# Patient Record
Sex: FEMALE | Race: WHITE | Employment: OTHER | URBAN - METROPOLITAN AREA
[De-identification: names, ages, dates, MRNs, and addresses within clinical notes are randomized per-mention and may not be internally consistent; named-entity substitution may affect disease eponyms.]

---

## 2024-08-14 ENCOUNTER — APPOINTMENT (OUTPATIENT)
Dept: ULTRASOUND IMAGING | Age: 69
DRG: 871 | End: 2024-08-14
Payer: MEDICARE

## 2024-08-14 ENCOUNTER — APPOINTMENT (OUTPATIENT)
Dept: CT IMAGING | Age: 69
DRG: 871 | End: 2024-08-14
Payer: MEDICARE

## 2024-08-14 ENCOUNTER — HOSPITAL ENCOUNTER (INPATIENT)
Age: 69
DRG: 871 | End: 2024-08-14
Attending: EMERGENCY MEDICINE | Admitting: INTERNAL MEDICINE
Payer: MEDICARE

## 2024-08-14 ENCOUNTER — APPOINTMENT (OUTPATIENT)
Dept: GENERAL RADIOLOGY | Age: 69
DRG: 871 | End: 2024-08-14
Payer: MEDICARE

## 2024-08-14 DIAGNOSIS — K85.90 ACUTE PANCREATITIS, UNSPECIFIED COMPLICATION STATUS, UNSPECIFIED PANCREATITIS TYPE: ICD-10-CM

## 2024-08-14 DIAGNOSIS — R79.89 ELEVATED LFTS: ICD-10-CM

## 2024-08-14 DIAGNOSIS — K80.20 GALLSTONES: ICD-10-CM

## 2024-08-14 DIAGNOSIS — R17 PAINLESS JAUNDICE: Primary | ICD-10-CM

## 2024-08-14 DIAGNOSIS — D72.829 LEUKOCYTOSIS, UNSPECIFIED TYPE: ICD-10-CM

## 2024-08-14 DIAGNOSIS — I24.9 ACUTE CORONARY SYNDROME (HCC): ICD-10-CM

## 2024-08-14 PROBLEM — K81.0 ACUTE CHOLECYSTITIS: Status: ACTIVE | Noted: 2024-08-14

## 2024-08-14 PROBLEM — K83.1 OBSTRUCTIVE JAUNDICE: Status: ACTIVE | Noted: 2024-08-14

## 2024-08-14 LAB
ALBUMIN SERPL-MCNC: 2.9 G/DL (ref 3.5–5.2)
ALP SERPL-CCNC: 240 U/L (ref 35–104)
ALT SERPL-CCNC: 110 U/L (ref 0–32)
AMMONIA PLAS-SCNC: 20 UMOL/L (ref 11–51)
ANION GAP SERPL CALCULATED.3IONS-SCNC: 14 MMOL/L (ref 7–16)
AST SERPL-CCNC: 75 U/L (ref 0–31)
B PARAP IS1001 DNA NPH QL NAA+NON-PROBE: NOT DETECTED
B PERT DNA SPEC QL NAA+PROBE: NOT DETECTED
BASOPHILS # BLD: 0 K/UL (ref 0–0.2)
BASOPHILS NFR BLD: 0 % (ref 0–2)
BILIRUB DIRECT SERPL-MCNC: 3.9 MG/DL (ref 0–0.3)
BILIRUB INDIRECT SERPL-MCNC: 1.6 MG/DL (ref 0–1)
BILIRUB SERPL-MCNC: 5.5 MG/DL (ref 0–1.2)
BILIRUB SERPL-MCNC: 5.7 MG/DL (ref 0–1.2)
BUN SERPL-MCNC: 12 MG/DL (ref 6–23)
C PNEUM DNA NPH QL NAA+NON-PROBE: NOT DETECTED
CALCIUM SERPL-MCNC: 8.1 MG/DL (ref 8.6–10.2)
CHLORIDE SERPL-SCNC: 84 MMOL/L (ref 98–107)
CO2 SERPL-SCNC: 27 MMOL/L (ref 22–29)
CREAT SERPL-MCNC: 0.8 MG/DL (ref 0.5–1)
EKG ATRIAL RATE: 80 BPM
EKG P AXIS: 29 DEGREES
EKG P-R INTERVAL: 156 MS
EKG Q-T INTERVAL: 388 MS
EKG QRS DURATION: 84 MS
EKG QTC CALCULATION (BAZETT): 447 MS
EKG R AXIS: 19 DEGREES
EKG T AXIS: 129 DEGREES
EKG VENTRICULAR RATE: 80 BPM
EOSINOPHIL # BLD: 0 K/UL (ref 0.05–0.5)
EOSINOPHILS RELATIVE PERCENT: 0 % (ref 0–6)
ERYTHROCYTE [DISTWIDTH] IN BLOOD BY AUTOMATED COUNT: 13.7 % (ref 11.5–15)
FLUAV RNA NPH QL NAA+NON-PROBE: NOT DETECTED
FLUBV RNA NPH QL NAA+NON-PROBE: NOT DETECTED
GFR, ESTIMATED: 80 ML/MIN/1.73M2
GLUCOSE SERPL-MCNC: 152 MG/DL (ref 74–99)
HADV DNA NPH QL NAA+NON-PROBE: NOT DETECTED
HCOV 229E RNA NPH QL NAA+NON-PROBE: NOT DETECTED
HCOV HKU1 RNA NPH QL NAA+NON-PROBE: NOT DETECTED
HCOV NL63 RNA NPH QL NAA+NON-PROBE: NOT DETECTED
HCOV OC43 RNA NPH QL NAA+NON-PROBE: NOT DETECTED
HCT VFR BLD AUTO: 41.2 % (ref 34–48)
HGB BLD-MCNC: 13.8 G/DL (ref 11.5–15.5)
HMPV RNA NPH QL NAA+NON-PROBE: NOT DETECTED
HPIV1 RNA NPH QL NAA+NON-PROBE: NOT DETECTED
HPIV2 RNA NPH QL NAA+NON-PROBE: NOT DETECTED
HPIV3 RNA NPH QL NAA+NON-PROBE: NOT DETECTED
HPIV4 RNA NPH QL NAA+NON-PROBE: NOT DETECTED
INR PPP: 1.7
LACTATE BLDV-SCNC: 2.2 MMOL/L (ref 0.5–1.9)
LIPASE SERPL-CCNC: 371 U/L (ref 13–60)
LYMPHOCYTES NFR BLD: 0.41 K/UL (ref 1.5–4)
LYMPHOCYTES RELATIVE PERCENT: 1 % (ref 20–42)
M PNEUMO DNA NPH QL NAA+NON-PROBE: NOT DETECTED
MCH RBC QN AUTO: 29.1 PG (ref 26–35)
MCHC RBC AUTO-ENTMCNC: 33.5 G/DL (ref 32–34.5)
MCV RBC AUTO: 86.7 FL (ref 80–99.9)
MONOCYTES NFR BLD: 2.9 K/UL (ref 0.1–0.95)
MONOCYTES NFR BLD: 6 % (ref 2–12)
NEUTROPHILS NFR BLD: 93 % (ref 43–80)
NEUTS SEG NFR BLD: 44.38 K/UL (ref 1.8–7.3)
PARTIAL THROMBOPLASTIN TIME: 27.5 SEC (ref 24.5–35.1)
PLATELET # BLD AUTO: 407 K/UL (ref 130–450)
PMV BLD AUTO: 10.1 FL (ref 7–12)
POTASSIUM SERPL-SCNC: 3.2 MMOL/L (ref 3.5–5)
PROT SERPL-MCNC: 7.5 G/DL (ref 6.4–8.3)
PROTHROMBIN TIME: 18.1 SEC (ref 9.3–12.4)
RBC # BLD AUTO: 4.75 M/UL (ref 3.5–5.5)
RBC # BLD: ABNORMAL 10*6/UL
RSV RNA NPH QL NAA+NON-PROBE: NOT DETECTED
RV+EV RNA NPH QL NAA+NON-PROBE: NOT DETECTED
SARS-COV-2 RDRP RESP QL NAA+PROBE: NOT DETECTED
SARS-COV-2 RNA NPH QL NAA+NON-PROBE: NOT DETECTED
SODIUM SERPL-SCNC: 125 MMOL/L (ref 132–146)
SPECIMEN DESCRIPTION: NORMAL
SPECIMEN DESCRIPTION: NORMAL
TROPONIN I SERPL HS-MCNC: 135 NG/L (ref 0–9)
TROPONIN I SERPL HS-MCNC: 138 NG/L (ref 0–9)
TSH SERPL DL<=0.05 MIU/L-ACNC: 1.63 UIU/ML (ref 0.27–4.2)
WBC OTHER # BLD: 47.7 K/UL (ref 4.5–11.5)

## 2024-08-14 PROCEDURE — 99285 EMERGENCY DEPT VISIT HI MDM: CPT

## 2024-08-14 PROCEDURE — 80053 COMPREHEN METABOLIC PANEL: CPT

## 2024-08-14 PROCEDURE — 74177 CT ABD & PELVIS W/CONTRAST: CPT

## 2024-08-14 PROCEDURE — 96374 THER/PROPH/DIAG INJ IV PUSH: CPT

## 2024-08-14 PROCEDURE — 87040 BLOOD CULTURE FOR BACTERIA: CPT

## 2024-08-14 PROCEDURE — 85610 PROTHROMBIN TIME: CPT

## 2024-08-14 PROCEDURE — 87635 SARS-COV-2 COVID-19 AMP PRB: CPT

## 2024-08-14 PROCEDURE — 93005 ELECTROCARDIOGRAM TRACING: CPT | Performed by: EMERGENCY MEDICINE

## 2024-08-14 PROCEDURE — 2060000000 HC ICU INTERMEDIATE R&B

## 2024-08-14 PROCEDURE — 2580000003 HC RX 258: Performed by: EMERGENCY MEDICINE

## 2024-08-14 PROCEDURE — 96375 TX/PRO/DX INJ NEW DRUG ADDON: CPT

## 2024-08-14 PROCEDURE — 6360000004 HC RX CONTRAST MEDICATION: Performed by: RADIOLOGY

## 2024-08-14 PROCEDURE — 82248 BILIRUBIN DIRECT: CPT

## 2024-08-14 PROCEDURE — 96361 HYDRATE IV INFUSION ADD-ON: CPT

## 2024-08-14 PROCEDURE — 84484 ASSAY OF TROPONIN QUANT: CPT

## 2024-08-14 PROCEDURE — 0202U NFCT DS 22 TRGT SARS-COV-2: CPT

## 2024-08-14 PROCEDURE — 82140 ASSAY OF AMMONIA: CPT

## 2024-08-14 PROCEDURE — 6360000002 HC RX W HCPCS: Performed by: EMERGENCY MEDICINE

## 2024-08-14 PROCEDURE — 85025 COMPLETE CBC W/AUTO DIFF WBC: CPT

## 2024-08-14 PROCEDURE — 83690 ASSAY OF LIPASE: CPT

## 2024-08-14 PROCEDURE — 6360000002 HC RX W HCPCS: Performed by: NURSE PRACTITIONER

## 2024-08-14 PROCEDURE — 2580000003 HC RX 258: Performed by: NURSE PRACTITIONER

## 2024-08-14 PROCEDURE — 85730 THROMBOPLASTIN TIME PARTIAL: CPT

## 2024-08-14 PROCEDURE — 76705 ECHO EXAM OF ABDOMEN: CPT

## 2024-08-14 PROCEDURE — 84443 ASSAY THYROID STIM HORMONE: CPT

## 2024-08-14 PROCEDURE — 93010 ELECTROCARDIOGRAM REPORT: CPT | Performed by: INTERNAL MEDICINE

## 2024-08-14 PROCEDURE — 83605 ASSAY OF LACTIC ACID: CPT

## 2024-08-14 PROCEDURE — 71045 X-RAY EXAM CHEST 1 VIEW: CPT

## 2024-08-14 RX ORDER — HYDROCHLOROTHIAZIDE 25 MG/1
25 TABLET ORAL EVERY MORNING
COMMUNITY

## 2024-08-14 RX ORDER — SODIUM CHLORIDE 0.9 % (FLUSH) 0.9 %
5-40 SYRINGE (ML) INJECTION EVERY 12 HOURS SCHEDULED
Status: DISCONTINUED | OUTPATIENT
Start: 2024-08-14 | End: 2024-08-20 | Stop reason: HOSPADM

## 2024-08-14 RX ORDER — METOPROLOL SUCCINATE 25 MG/1
25 TABLET, EXTENDED RELEASE ORAL EVERY MORNING
Status: DISCONTINUED | OUTPATIENT
Start: 2024-08-15 | End: 2024-08-16

## 2024-08-14 RX ORDER — POTASSIUM CHLORIDE 7.45 MG/ML
10 INJECTION INTRAVENOUS PRN
Status: DISCONTINUED | OUTPATIENT
Start: 2024-08-14 | End: 2024-08-20 | Stop reason: HOSPADM

## 2024-08-14 RX ORDER — CYANOCOBALAMIN 1000 UG/ML
1000 INJECTION, SOLUTION INTRAMUSCULAR; SUBCUTANEOUS
Status: DISCONTINUED | OUTPATIENT
Start: 2024-09-01 | End: 2024-08-20 | Stop reason: HOSPADM

## 2024-08-14 RX ORDER — METOPROLOL SUCCINATE 25 MG/1
25 TABLET, EXTENDED RELEASE ORAL EVERY MORNING
Status: ON HOLD | COMMUNITY
End: 2024-08-20 | Stop reason: HOSPADM

## 2024-08-14 RX ORDER — ONDANSETRON 4 MG/1
4 TABLET, ORALLY DISINTEGRATING ORAL EVERY 8 HOURS PRN
Status: DISCONTINUED | OUTPATIENT
Start: 2024-08-14 | End: 2024-08-20 | Stop reason: HOSPADM

## 2024-08-14 RX ORDER — BISACODYL 10 MG
10 SUPPOSITORY, RECTAL RECTAL DAILY PRN
Status: DISCONTINUED | OUTPATIENT
Start: 2024-08-14 | End: 2024-08-20 | Stop reason: HOSPADM

## 2024-08-14 RX ORDER — ONDANSETRON 2 MG/ML
4 INJECTION INTRAMUSCULAR; INTRAVENOUS ONCE
Status: DISCONTINUED | OUTPATIENT
Start: 2024-08-14 | End: 2024-08-20 | Stop reason: HOSPADM

## 2024-08-14 RX ORDER — 0.9 % SODIUM CHLORIDE 0.9 %
1000 INTRAVENOUS SOLUTION INTRAVENOUS ONCE
Status: COMPLETED | OUTPATIENT
Start: 2024-08-14 | End: 2024-08-14

## 2024-08-14 RX ORDER — POTASSIUM CHLORIDE 7.45 MG/ML
10 INJECTION INTRAVENOUS
Status: DISPENSED | OUTPATIENT
Start: 2024-08-14 | End: 2024-08-14

## 2024-08-14 RX ORDER — SODIUM CHLORIDE 0.9 % (FLUSH) 0.9 %
5-40 SYRINGE (ML) INJECTION PRN
Status: DISCONTINUED | OUTPATIENT
Start: 2024-08-14 | End: 2024-08-20 | Stop reason: HOSPADM

## 2024-08-14 RX ORDER — LEVOTHYROXINE SODIUM 75 UG/1
75 TABLET ORAL EVERY MORNING
COMMUNITY

## 2024-08-14 RX ORDER — CYANOCOBALAMIN 1000 UG/ML
1000 INJECTION, SOLUTION INTRAMUSCULAR; SUBCUTANEOUS
COMMUNITY
Start: 2024-06-04

## 2024-08-14 RX ORDER — LEVOTHYROXINE SODIUM 75 UG/1
75 TABLET ORAL EVERY MORNING
Status: DISCONTINUED | OUTPATIENT
Start: 2024-08-15 | End: 2024-08-20 | Stop reason: HOSPADM

## 2024-08-14 RX ORDER — HYDROCHLOROTHIAZIDE 25 MG/1
25 TABLET ORAL EVERY MORNING
Status: DISCONTINUED | OUTPATIENT
Start: 2024-08-15 | End: 2024-08-20

## 2024-08-14 RX ORDER — ONDANSETRON 2 MG/ML
4 INJECTION INTRAMUSCULAR; INTRAVENOUS EVERY 6 HOURS PRN
Status: DISCONTINUED | OUTPATIENT
Start: 2024-08-14 | End: 2024-08-20 | Stop reason: HOSPADM

## 2024-08-14 RX ORDER — SODIUM CHLORIDE 9 MG/ML
INJECTION, SOLUTION INTRAVENOUS PRN
Status: DISCONTINUED | OUTPATIENT
Start: 2024-08-14 | End: 2024-08-20 | Stop reason: HOSPADM

## 2024-08-14 RX ORDER — POTASSIUM CHLORIDE 1500 MG/1
40 TABLET, EXTENDED RELEASE ORAL PRN
Status: DISCONTINUED | OUTPATIENT
Start: 2024-08-14 | End: 2024-08-20 | Stop reason: HOSPADM

## 2024-08-14 RX ORDER — IOPAMIDOL 755 MG/ML
75 INJECTION, SOLUTION INTRAVASCULAR
Status: COMPLETED | OUTPATIENT
Start: 2024-08-14 | End: 2024-08-14

## 2024-08-14 RX ORDER — 0.9 % SODIUM CHLORIDE 0.9 %
500 INTRAVENOUS SOLUTION INTRAVENOUS ONCE
Status: COMPLETED | OUTPATIENT
Start: 2024-08-14 | End: 2024-08-14

## 2024-08-14 RX ORDER — MAGNESIUM SULFATE IN WATER 40 MG/ML
2000 INJECTION, SOLUTION INTRAVENOUS PRN
Status: DISCONTINUED | OUTPATIENT
Start: 2024-08-14 | End: 2024-08-20 | Stop reason: HOSPADM

## 2024-08-14 RX ORDER — SODIUM CHLORIDE 9 MG/ML
INJECTION, SOLUTION INTRAVENOUS CONTINUOUS
Status: DISCONTINUED | OUTPATIENT
Start: 2024-08-14 | End: 2024-08-19

## 2024-08-14 RX ADMIN — PIPERACILLIN AND TAZOBACTAM 4500 MG: 4; .5 INJECTION, POWDER, LYOPHILIZED, FOR SOLUTION INTRAVENOUS at 18:31

## 2024-08-14 RX ADMIN — POTASSIUM CHLORIDE 10 MEQ: 7.46 INJECTION, SOLUTION INTRAVENOUS at 23:29

## 2024-08-14 RX ADMIN — POTASSIUM CHLORIDE 10 MEQ: 7.46 INJECTION, SOLUTION INTRAVENOUS at 20:46

## 2024-08-14 RX ADMIN — POTASSIUM CHLORIDE 10 MEQ: 7.46 INJECTION, SOLUTION INTRAVENOUS at 23:00

## 2024-08-14 RX ADMIN — SODIUM CHLORIDE: 9 INJECTION, SOLUTION INTRAVENOUS at 23:23

## 2024-08-14 RX ADMIN — SODIUM CHLORIDE 1000 ML: 9 INJECTION, SOLUTION INTRAVENOUS at 20:47

## 2024-08-14 RX ADMIN — SODIUM CHLORIDE 500 ML: 9 INJECTION, SOLUTION INTRAVENOUS at 14:06

## 2024-08-14 RX ADMIN — IOPAMIDOL 75 ML: 755 INJECTION, SOLUTION INTRAVENOUS at 16:51

## 2024-08-14 RX ADMIN — SODIUM CHLORIDE, PRESERVATIVE FREE 10 ML: 5 INJECTION INTRAVENOUS at 23:30

## 2024-08-14 ASSESSMENT — LIFESTYLE VARIABLES
HOW MANY STANDARD DRINKS CONTAINING ALCOHOL DO YOU HAVE ON A TYPICAL DAY: PATIENT DOES NOT DRINK
HOW OFTEN DO YOU HAVE A DRINK CONTAINING ALCOHOL: NEVER

## 2024-08-14 ASSESSMENT — PAIN - FUNCTIONAL ASSESSMENT
PAIN_FUNCTIONAL_ASSESSMENT: NONE - DENIES PAIN
PAIN_FUNCTIONAL_ASSESSMENT: NONE - DENIES PAIN

## 2024-08-14 NOTE — ED NOTES
Pt complains of extreme fatigue for approx 1 week. Pt states she has not had the energy to eat/drink appropriately for the week.

## 2024-08-14 NOTE — ED PROVIDER NOTES
[facility] MED SURG  EMERGENCY DEPARTMENT ENCOUNTER        Pt Name: Minerva Bell  MRN: [id number]  Birthdate 1955  Date of evaluation: 8/14/2024  Provider: Marquita Yoder MD  PCP: Dave Anderson MD  Note Started: 1:20 PM EDT 8/14/24    CHIEF COMPLAINT       Chief Complaint   Patient presents with    Fatigue     \"Think I got a virus. All I can do is lay on the bed.\" General weakness. no n/v.  No  appetite, lower oral intake, gas pains x 1 week       HISTORY OF PRESENT ILLNESS: 1 or more Elements   History From: Patient    Limitations to history : None    Minerva Bell is a 69 y.o. female who presents to the emergency department with concerns of generalized fatigue for the past week.  She lives at home by herself and place without walker or cane or assistance.  She states has been sleeping a lot more.  She denies any chest pain or shortness of breath she has had some diffuse gas pain to her abdomen but no abdominal pain at this time.  She denies any fevers or chills denies any urinary complaints.  Denies any cough or congestion.  Denies any known sick contacts to COVID or any other viral illness.  She think she feels like she has a virus but she is not eating or drinking well.  No headache no fall no trauma family member brought her in thinking that her eyes seemed slightly yellowish in discoloration.  No known liver disease.  No abdominal pain at this time.    Nursing Notes were all reviewed and agreed with or any disagreements were addressed in the HPI.      REVIEW OF EXTERNAL NOTE :       PDMP reviewed.    REVIEW OF SYSTEMS :           Positives and Pertinent negatives as per HPI.     SURGICAL HISTORY   History reviewed. No pertinent surgical history.    CURRENTMEDICATIONS       Current Discharge Medication List        CONTINUE these medications which have NOT CHANGED    Details   hydroCHLOROthiazide (HYDRODIURIL) 25 MG tablet Take 1 tablet by mouth every morning

## 2024-08-14 NOTE — ED TRIAGE NOTES
\"Think I got a virus. All I can do is lay on the bed.\" General weakness. no n/v.  No  appetite, lower oral intake, gas pains x 1 week

## 2024-08-14 NOTE — ED NOTES
Department of Emergency Medicine  FIRST PROVIDER TRIAGE NOTE             Independent MLP           8/14/24  12:27 PM EDT    Date of Encounter: 8/14/24   MRN: [id number]      HPI: Minerva Bell is a 69 y.o. female who presents to the ED for No chief complaint on file.   ONGOING FOR A WEEK.  NOT AROUND ANYONE WHO HAS BEEN SICK THAT SHE KNOWS OF.  HAS BEEN VERY FATIGUED.   EATING/DRINKING LESS.     ROS: Negative for cp or sob.    PE: Gen Appearance/Constitutional: alert  HEENT: NC/NT. PERRLA,  Airway patent.    Provider-Patient relationship only established for Provider In Triage (PIT)  Full assessment, HPI and examination not performed, therefore, it is not yet possible to state whether or not an emergency medical condition exists   Focused assessment only during triage. This is not a comprehensive evaluation due to no physical ER space, staff shortage and high numbers of boarding patients in the ER.       Initial Plan of Care: All treatment areas with department are currently occupied. Plan to order/Initiate the following while awaiting opening in ED.  Initiate Treatment-Testing, Proceed toTreatment Area When Bed Available for ED Attending/MLP to Continue Care    Electronically signed by LUCIE Ariza CNP   DD: 8/14/24      Agus Méndez APRN - CNP  08/14/24 2965

## 2024-08-15 ENCOUNTER — APPOINTMENT (OUTPATIENT)
Dept: MRI IMAGING | Age: 69
DRG: 871 | End: 2024-08-15
Payer: MEDICARE

## 2024-08-15 PROBLEM — R17 PAINLESS JAUNDICE: Status: ACTIVE | Noted: 2024-08-15

## 2024-08-15 LAB
ALBUMIN SERPL-MCNC: 2.4 G/DL (ref 3.5–5.2)
ALBUMIN SERPL-MCNC: 2.4 G/DL (ref 3.5–5.2)
ALP SERPL-CCNC: 196 U/L (ref 35–104)
ALP SERPL-CCNC: 231 U/L (ref 35–104)
ALT SERPL-CCNC: 66 U/L (ref 0–32)
ALT SERPL-CCNC: 77 U/L (ref 0–32)
AMORPH SED URNS QL MICRO: PRESENT
ANION GAP SERPL CALCULATED.3IONS-SCNC: 13 MMOL/L (ref 7–16)
ANION GAP SERPL CALCULATED.3IONS-SCNC: 13 MMOL/L (ref 7–16)
AST SERPL-CCNC: 49 U/L (ref 0–31)
AST SERPL-CCNC: 55 U/L (ref 0–31)
BACTERIA URNS QL MICRO: ABNORMAL
BASOPHILS # BLD: 0 K/UL (ref 0–0.2)
BASOPHILS NFR BLD: 0 % (ref 0–2)
BILIRUB SERPL-MCNC: 5.2 MG/DL (ref 0–1.2)
BILIRUB SERPL-MCNC: 5.3 MG/DL (ref 0–1.2)
BILIRUB UR QL STRIP: ABNORMAL
BUN SERPL-MCNC: 10 MG/DL (ref 6–23)
BUN SERPL-MCNC: 11 MG/DL (ref 6–23)
CALCIUM SERPL-MCNC: 7.5 MG/DL (ref 8.6–10.2)
CALCIUM SERPL-MCNC: 7.9 MG/DL (ref 8.6–10.2)
CHLORIDE SERPL-SCNC: 86 MMOL/L (ref 98–107)
CHLORIDE SERPL-SCNC: 89 MMOL/L (ref 98–107)
CLARITY UR: CLEAR
CO2 SERPL-SCNC: 26 MMOL/L (ref 22–29)
CO2 SERPL-SCNC: 26 MMOL/L (ref 22–29)
COLOR UR: ABNORMAL
CREAT SERPL-MCNC: 0.8 MG/DL (ref 0.5–1)
CREAT SERPL-MCNC: 0.9 MG/DL (ref 0.5–1)
EKG ATRIAL RATE: 84 BPM
EKG ATRIAL RATE: 85 BPM
EKG P AXIS: 105 DEGREES
EKG P AXIS: 39 DEGREES
EKG P-R INTERVAL: 140 MS
EKG P-R INTERVAL: 148 MS
EKG Q-T INTERVAL: 394 MS
EKG Q-T INTERVAL: 396 MS
EKG QRS DURATION: 82 MS
EKG QRS DURATION: 92 MS
EKG QTC CALCULATION (BAZETT): 467 MS
EKG QTC CALCULATION (BAZETT): 468 MS
EKG R AXIS: 16 DEGREES
EKG R AXIS: 167 DEGREES
EKG T AXIS: 139 DEGREES
EKG T AXIS: 43 DEGREES
EKG VENTRICULAR RATE: 84 BPM
EKG VENTRICULAR RATE: 85 BPM
EOSINOPHIL # BLD: 0 K/UL (ref 0.05–0.5)
EOSINOPHILS RELATIVE PERCENT: 0 % (ref 0–6)
EPI CELLS #/AREA URNS HPF: ABNORMAL /HPF
ERYTHROCYTE [DISTWIDTH] IN BLOOD BY AUTOMATED COUNT: 13.8 % (ref 11.5–15)
ERYTHROCYTE [DISTWIDTH] IN BLOOD BY AUTOMATED COUNT: 14.2 % (ref 11.5–15)
ERYTHROCYTE [DISTWIDTH] IN BLOOD BY AUTOMATED COUNT: 14.4 % (ref 11.5–15)
GFR, ESTIMATED: 72 ML/MIN/1.73M2
GFR, ESTIMATED: 85 ML/MIN/1.73M2
GLUCOSE SERPL-MCNC: 121 MG/DL (ref 74–99)
GLUCOSE SERPL-MCNC: 137 MG/DL (ref 74–99)
GLUCOSE UR STRIP-MCNC: NEGATIVE MG/DL
HCT VFR BLD AUTO: 36.8 % (ref 34–48)
HCT VFR BLD AUTO: 36.9 % (ref 34–48)
HCT VFR BLD AUTO: 37.9 % (ref 34–48)
HGB BLD-MCNC: 12.4 G/DL (ref 11.5–15.5)
HGB BLD-MCNC: 12.6 G/DL (ref 11.5–15.5)
HGB BLD-MCNC: 12.7 G/DL (ref 11.5–15.5)
HGB UR QL STRIP.AUTO: NEGATIVE
KETONES UR STRIP-MCNC: ABNORMAL MG/DL
LACTATE BLDV-SCNC: 2 MMOL/L (ref 0.5–2.2)
LEUKOCYTE ESTERASE UR QL STRIP: NEGATIVE
LIPASE SERPL-CCNC: 193 U/L (ref 13–60)
LYMPHOCYTES NFR BLD: 0 K/UL (ref 1.5–4)
LYMPHOCYTES NFR BLD: 0.49 K/UL (ref 1.5–4)
LYMPHOCYTES NFR BLD: 1.26 K/UL (ref 1.5–4)
LYMPHOCYTES RELATIVE PERCENT: 0 % (ref 20–42)
LYMPHOCYTES RELATIVE PERCENT: 1 % (ref 20–42)
LYMPHOCYTES RELATIVE PERCENT: 2 % (ref 20–42)
MAGNESIUM SERPL-MCNC: 1.9 MG/DL (ref 1.6–2.6)
MCH RBC QN AUTO: 29.4 PG (ref 26–35)
MCH RBC QN AUTO: 29.5 PG (ref 26–35)
MCH RBC QN AUTO: 29.9 PG (ref 26–35)
MCHC RBC AUTO-ENTMCNC: 33.2 G/DL (ref 32–34.5)
MCHC RBC AUTO-ENTMCNC: 33.7 G/DL (ref 32–34.5)
MCHC RBC AUTO-ENTMCNC: 34.4 G/DL (ref 32–34.5)
MCV RBC AUTO: 86.8 FL (ref 80–99.9)
MCV RBC AUTO: 87.4 FL (ref 80–99.9)
MCV RBC AUTO: 88.3 FL (ref 80–99.9)
METAMYELOCYTES ABSOLUTE COUNT: 0.49 K/UL (ref 0–0.12)
METAMYELOCYTES: 1 % (ref 0–1)
MONOCYTES NFR BLD: 1.89 K/UL (ref 0.1–0.95)
MONOCYTES NFR BLD: 1.91 K/UL (ref 0.1–0.95)
MONOCYTES NFR BLD: 1.96 K/UL (ref 0.1–0.95)
MONOCYTES NFR BLD: 3 % (ref 2–12)
MONOCYTES NFR BLD: 3 % (ref 2–12)
MONOCYTES NFR BLD: 4 % (ref 2–12)
NEUTROPHILS NFR BLD: 95 % (ref 43–80)
NEUTROPHILS NFR BLD: 95 % (ref 43–80)
NEUTROPHILS NFR BLD: 97 % (ref 43–80)
NEUTS SEG NFR BLD: 53.46 K/UL (ref 1.8–7.3)
NEUTS SEG NFR BLD: 54.29 K/UL (ref 1.8–7.3)
NEUTS SEG NFR BLD: 59.85 K/UL (ref 1.8–7.3)
NITRITE UR QL STRIP: NEGATIVE
PH UR STRIP: 5.5 [PH] (ref 5–9)
PLATELET # BLD AUTO: 341 K/UL (ref 130–450)
PLATELET # BLD AUTO: 354 K/UL (ref 130–450)
PLATELET # BLD AUTO: 416 K/UL (ref 130–450)
PMV BLD AUTO: 10 FL (ref 7–12)
PMV BLD AUTO: 10.2 FL (ref 7–12)
PMV BLD AUTO: 9.9 FL (ref 7–12)
POTASSIUM SERPL-SCNC: 3.4 MMOL/L (ref 3.5–5)
POTASSIUM SERPL-SCNC: 3.7 MMOL/L (ref 3.5–5)
PROT SERPL-MCNC: 6.6 G/DL (ref 6.4–8.3)
PROT SERPL-MCNC: 6.7 G/DL (ref 6.4–8.3)
PROT UR STRIP-MCNC: 30 MG/DL
RBC # BLD AUTO: 4.21 M/UL (ref 3.5–5.5)
RBC # BLD AUTO: 4.25 M/UL (ref 3.5–5.5)
RBC # BLD AUTO: 4.29 M/UL (ref 3.5–5.5)
RBC # BLD: ABNORMAL 10*6/UL
RBC #/AREA URNS HPF: ABNORMAL /HPF
SODIUM SERPL-SCNC: 125 MMOL/L (ref 132–146)
SODIUM SERPL-SCNC: 128 MMOL/L (ref 132–146)
SP GR UR STRIP: 1.02 (ref 1–1.03)
TROPONIN I SERPL HS-MCNC: 128 NG/L (ref 0–9)
UROBILINOGEN UR STRIP-ACNC: 4 EU/DL (ref 0–1)
WBC # BLD: ABNORMAL 10*3/UL
WBC # BLD: ABNORMAL 10*3/UL
WBC #/AREA URNS HPF: ABNORMAL /HPF
WBC OTHER # BLD: 56.2 K/UL (ref 4.5–11.5)
WBC OTHER # BLD: 56.4 K/UL (ref 4.5–11.5)
WBC OTHER # BLD: 63 K/UL (ref 4.5–11.5)

## 2024-08-15 PROCEDURE — 99223 1ST HOSP IP/OBS HIGH 75: CPT | Performed by: INTERNAL MEDICINE

## 2024-08-15 PROCEDURE — 2580000003 HC RX 258: Performed by: NURSE PRACTITIONER

## 2024-08-15 PROCEDURE — APPSS180 APP SPLIT SHARED TIME > 60 MINUTES: Performed by: NURSE PRACTITIONER

## 2024-08-15 PROCEDURE — 36415 COLL VENOUS BLD VENIPUNCTURE: CPT

## 2024-08-15 PROCEDURE — 99223 1ST HOSP IP/OBS HIGH 75: CPT | Performed by: SURGERY

## 2024-08-15 PROCEDURE — 85025 COMPLETE CBC W/AUTO DIFF WBC: CPT

## 2024-08-15 PROCEDURE — 70551 MRI BRAIN STEM W/O DYE: CPT

## 2024-08-15 PROCEDURE — 93005 ELECTROCARDIOGRAM TRACING: CPT | Performed by: INTERNAL MEDICINE

## 2024-08-15 PROCEDURE — 74181 MRI ABDOMEN W/O CONTRAST: CPT

## 2024-08-15 PROCEDURE — 83605 ASSAY OF LACTIC ACID: CPT

## 2024-08-15 PROCEDURE — 6360000002 HC RX W HCPCS: Performed by: NURSE PRACTITIONER

## 2024-08-15 PROCEDURE — 80053 COMPREHEN METABOLIC PANEL: CPT

## 2024-08-15 PROCEDURE — 81001 URINALYSIS AUTO W/SCOPE: CPT

## 2024-08-15 PROCEDURE — 84484 ASSAY OF TROPONIN QUANT: CPT

## 2024-08-15 PROCEDURE — 6370000000 HC RX 637 (ALT 250 FOR IP): Performed by: NURSE PRACTITIONER

## 2024-08-15 PROCEDURE — 6360000002 HC RX W HCPCS: Performed by: INTERNAL MEDICINE

## 2024-08-15 PROCEDURE — 83735 ASSAY OF MAGNESIUM: CPT

## 2024-08-15 PROCEDURE — 2060000000 HC ICU INTERMEDIATE R&B

## 2024-08-15 PROCEDURE — 83690 ASSAY OF LIPASE: CPT

## 2024-08-15 PROCEDURE — 93010 ELECTROCARDIOGRAM REPORT: CPT | Performed by: INTERNAL MEDICINE

## 2024-08-15 PROCEDURE — 2500000003 HC RX 250 WO HCPCS: Performed by: NURSE PRACTITIONER

## 2024-08-15 RX ORDER — LORAZEPAM 2 MG/ML
1 INJECTION INTRAMUSCULAR EVERY 6 HOURS PRN
Status: DISCONTINUED | OUTPATIENT
Start: 2024-08-15 | End: 2024-08-20 | Stop reason: HOSPADM

## 2024-08-15 RX ORDER — CALCIUM GLUCONATE 20 MG/ML
1000 INJECTION, SOLUTION INTRAVENOUS ONCE
Status: COMPLETED | OUTPATIENT
Start: 2024-08-15 | End: 2024-08-15

## 2024-08-15 RX ORDER — LORAZEPAM 2 MG/ML
1 INJECTION INTRAMUSCULAR
Status: COMPLETED | OUTPATIENT
Start: 2024-08-15 | End: 2024-08-15

## 2024-08-15 RX ADMIN — SODIUM CHLORIDE: 9 INJECTION, SOLUTION INTRAVENOUS at 05:26

## 2024-08-15 RX ADMIN — LORAZEPAM 1 MG: 2 INJECTION INTRAMUSCULAR; INTRAVENOUS at 20:20

## 2024-08-15 RX ADMIN — LEVOTHYROXINE SODIUM 75 MCG: 75 TABLET ORAL at 11:18

## 2024-08-15 RX ADMIN — PIPERACILLIN AND TAZOBACTAM 3375 MG: 3; .375 INJECTION, POWDER, LYOPHILIZED, FOR SOLUTION INTRAVENOUS at 20:11

## 2024-08-15 RX ADMIN — SODIUM CHLORIDE: 9 INJECTION, SOLUTION INTRAVENOUS at 04:04

## 2024-08-15 RX ADMIN — CALCIUM GLUCONATE 1000 MG: 20 INJECTION, SOLUTION INTRAVENOUS at 02:45

## 2024-08-15 RX ADMIN — METOPROLOL SUCCINATE 25 MG: 25 TABLET, EXTENDED RELEASE ORAL at 11:19

## 2024-08-15 RX ADMIN — LORAZEPAM 1 MG: 2 INJECTION INTRAMUSCULAR; INTRAVENOUS at 15:28

## 2024-08-15 RX ADMIN — SODIUM CHLORIDE: 9 INJECTION, SOLUTION INTRAVENOUS at 09:38

## 2024-08-15 RX ADMIN — SODIUM CHLORIDE: 9 INJECTION, SOLUTION INTRAVENOUS at 14:33

## 2024-08-15 RX ADMIN — PIPERACILLIN AND TAZOBACTAM 3375 MG: 3; .375 INJECTION, POWDER, LYOPHILIZED, FOR SOLUTION INTRAVENOUS at 12:36

## 2024-08-15 RX ADMIN — PIPERACILLIN AND TAZOBACTAM 3375 MG: 3; .375 INJECTION, POWDER, LYOPHILIZED, FOR SOLUTION INTRAVENOUS at 04:05

## 2024-08-15 ASSESSMENT — ENCOUNTER SYMPTOMS
ABDOMINAL PAIN: 0
CONSTIPATION: 0
DIARRHEA: 0
VOMITING: 0

## 2024-08-15 NOTE — PLAN OF CARE
Problem: Discharge Planning  Goal: Discharge to home or other facility with appropriate resources  Outcome: Progressing  Flowsheets (Taken 8/14/2024 3826)  Discharge to home or other facility with appropriate resources: Refer to discharge planning if patient needs post-hospital services based on physician order or complex needs related to functional status, cognitive ability or social support system     Problem: Safety - Adult  Goal: Free from fall injury  Outcome: Progressing

## 2024-08-15 NOTE — CONSULTS
Inpatient Cardiology Consultation      Reason for Consult: Elevated troponin, EKG results    Consulting Physician:     Requesting Physician:  Melinda Foreman    Date of Consultation: 8/15/2024    HISTORY OF PRESENT ILLNESS:   Minerva Bell  is a 69 y.o.  female unknown to [facility].  No prior cardiac evaluation to review in epic.  PMH: HTN, hypothyroidism, palpitations, and obesity.    see below    ED patient arrived to emergency department with complaints of fatigue and weakness that have been ongoing for the past week.  Reported that she had not been eating or drinking well with increased sleeping.  Patient complained of abdominal pain and some diffuse gas pain. ER workup revealed sodium level 125, potassium 3.2, troponin 135, elevated liver enzymes with a total bili of 5.7, Lipase 371, WBC 47.7, imaging reveals acute pancreatitis, cholecystitis and cholelithiasis.   Gastroenterology on consult  Arrival vitals: T98.4, RR 16, P84, /66, SpO2 98% on room air.  Significant Labs:   Lab Results   Component Value Date     (L) 08/15/2024    K 3.7 08/15/2024    CL 89 (L) 08/15/2024    CO2 26 08/15/2024    BUN 10 08/15/2024    CREATININE 0.9 08/15/2024    GLUCOSE 121 (H) 08/15/2024    CALCIUM 7.9 (L) 08/15/2024    BILITOT 5.2 (H) 08/15/2024    ALKPHOS 231 (H) 08/15/2024    AST 55 (H) 08/15/2024    ALT 66 (H) 08/15/2024    LABGLOM 72 08/15/2024    GFRAA >60 12/29/2016       Lab Results   Component Value Date    WBC 56.4 (H) 08/15/2024    HGB 12.6 08/15/2024    HCT 37.9 08/15/2024    MCV 88.3 08/15/2024     08/15/2024     Lab Results   Component Value Date    TROPHS 128 (H) 08/15/2024     RAD:   CXR: No acute process.  Mild cardiomegaly.  CT abdomen and pelvis:     IMPRESSION:  1.  Findings for acute cholecystitis.  The gallbladder is to well distended  with thickening of the wall surrounded by Anahi cholecystic fluid accumulation  and inflammatory changes.  Are multiple  disintegrating tablet 4 mg, 4 mg, Oral, Q8H PRN **OR** ondansetron (ZOFRAN) injection 4 mg, 4 mg, IntraVENous, Q6H PRN  bisacodyl (DULCOLAX) suppository 10 mg, 10 mg, Rectal, Daily PRN  0.9 % sodium chloride infusion, , IntraVENous, Continuous  piperacillin-tazobactam (ZOSYN) 3,375 mg in sodium chloride 0.9 % 50 mL IVPB, 3,375 mg, IntraVENous, Q8H  levothyroxine (SYNTHROID) tablet 75 mcg, 75 mcg, Oral, QAM  metoprolol succinate (TOPROL XL) extended release tablet 25 mg, 25 mg, Oral, QAM  [Held by provider] hydroCHLOROthiazide (HYDRODIURIL) tablet 25 mg, 25 mg, Oral, QAM  [START ON 9/1/2024] cyanocobalamin injection 1,000 mcg, 1,000 mcg, IntraMUSCular, Q30 Days    Allergies:  Patient has no known allergies.    Social History:    Tobacco use /vaping: Denies  Alcohol: Denies  Marijuana: Denies  Illicit: Denies  Independent lives alone, has emotional support dog. Denies any use of assistive devices  Retire nurse  Full Code      Social History     Socioeconomic History    Marital status:      Spouse name: Not on file    Number of children: Not on file    Years of education: Not on file    Highest education level: Not on file   Occupational History    Not on file   Tobacco Use    Smoking status: Never    Smokeless tobacco: Never   Substance and Sexual Activity    Alcohol use: Never    Drug use: Not on file    Sexual activity: Not on file   Other Topics Concern    Not on file   Social History Narrative    Not on file     Social Determinants of Health     Financial Resource Strain: Not on file   Food Insecurity: No Food Insecurity (8/14/2024)    Hunger Vital Sign     Worried About Running Out of Food in the Last Year: Never true     Ran Out of Food in the Last Year: Never true   Transportation Needs: No Transportation Needs (8/14/2024)    PRAPARE - Transportation     Lack of Transportation (Medical): No     Lack of Transportation (Non-Medical): No   Physical Activity: Not on file   Stress: Not on file   Social  throughout fields   CARDIOVASCULAR:     No carotid bruit  Heart Inspection- shows no noted pulsations  Heart Palpation- no heaves or thrills  Heart Ausculation- Regular rate and rhythm, no murmur. No s3, s4 or rub   PV: No lower extremity edema. No varicosities. Pedal pulses palpable  ABDOMEN: Soft, non-tender to light palpation. Bowel sounds present.   MS: Good muscle strength and tone. No atrophy or abnormal movements.   : Deferred  SKIN: Warm and dry    NEURO / PSYCH: Oriented to person, place and time. Speech clear and appropriate. Follows all commands. Pleasant affect     DATA:    ECG:    Tele strips:   NSR  Diagnostic:  See above    Orthostatic Vitals:       No data to display                Wt Readings from Last 3 Encounters:   08/15/24 91.7 kg (202 lb 3.2 oz)         Intake/Output Summary (Last 24 hours) at 8/15/2024 1247  Last data filed at 8/15/2024 0732  Gross per 24 hour   Intake 768.43 ml   Output 400 ml   Net 368.43 ml       Labs:   CBC:   Recent Labs     08/15/24  0010 08/15/24  0527   WBC 56.2* 56.4*   HGB 12.7 12.6   HCT 36.9 37.9    354     BMP:   Recent Labs     08/15/24  0010 08/15/24  0527   * 128*   K 3.4* 3.7   CO2 26 26   BUN 11 10   CREATININE 0.8 0.9   LABGLOM 85 72   CALCIUM 7.5* 7.9*     Mag:   Recent Labs     08/15/24  0010   MG 1.9     Phos: No results for input(s): \"PHOS\" in the last 72 hours.  TSH:   Lab Results   Component Value Date    TSH 1.63 08/14/2024       HgA1c: No results found for: \"LABA1C\"    PRO-BNP: No results found for: \"PROBNP\"  PT/INR:   Recent Labs     08/14/24  1345   PROTIME 18.1*   INR 1.7     APTT:  Recent Labs     08/14/24  1345   APTT 27.5     HS TROP:  Lab Results   Component Value Date/Time    TROPHS 128 08/15/2024 12:10 AM    TROPHS 138 08/14/2024 06:27 PM    TROPHS 135 08/14/2024 01:45 PM     LIPID PANEL:No results for input(s): \"CHOL\", \"HDL\", \"TRIG\", \"VLDL\" in the last 72 hours.    Invalid input(s): \"LDLCHOLESTEROL\"  LIVER PROFILE:  Recent

## 2024-08-15 NOTE — CARE COORDINATION
Social Work:    Chart reviewed. Minerva admitted due to obstructive jaundice, weakness, low oral intake. General surgery, cardiology, GI, were consulted. Social service met with Minerva, advised her about social service role and discussed discharge planning. Minerva is a patient of Dr. Anderson and uses [facility]. She resides with her service dog in an apartment with an elevator and was independent with ADL's prior to admission.  Minerva has a wheeled walker and cane if needed. She has never used [facility]HC or snf.  Minerva advises that her friend Alessandra Martin is her primary contact, however, she does not have her number handy.  Presently Keyana, a former student and friend of Minerva is listed as the contact. Social service will need to follow-up and assist with any possible needs.    Electronically signed by SIERRA Call on 8/15/2024 at 10:16 AM

## 2024-08-15 NOTE — PROGRESS NOTES
Marta Hunter NP notified of AM labs. Also notified that patient's visitor at bedside expresses concerns of AMS, stating this is not patient's baseline. Dr. Suarez to round on patient shortly.

## 2024-08-15 NOTE — CONSULTS
GENERAL SURGERY  CONSULT NOTE  8/15/2024    Physician Consulted: Dr. Curtis  Reason for Consult: gallstone pancreatitis  Referring Physician: Dr. Alex SHIELDS  Minerva Bell is a 69 y.o. female who presents to general surgery service for evaluation of extreme fatigue for the past week. She states she has been laying in bed more and has not been eating as much. She has been having some gas pain but denies current abdominal pain. Reports she was having bowel function prior to admission. Non-smoker, no alcohol use, denies any prior GB issues or pancreatitis.     Labs show ALT from 77 to 110, AST from 49 to 75 with T bili 5.5 to 5.3 with D Bili 3.9, wbc 47 to 56, INR 1.7,    RUQ US showed stones with wall thickening, -Cason    CT showed pericholecystic fluid with gallbladder wall thickening with lot of stones. There is also thickening around panc head with some air noted within pancreas      Medications Prior to Admission:    Prior to Admission medications    Medication Sig Start Date End Date Taking? Authorizing Provider   hydroCHLOROthiazide (HYDRODIURIL) 25 MG tablet Take 1 tablet by mouth every morning   Yes Lawrence Quintero MD   levothyroxine (SYNTHROID) 75 MCG tablet Take 1 tablet by mouth every morning   Yes Lawrence Quintero MD   metoprolol succinate (TOPROL XL) 25 MG extended release tablet Take 1 tablet by mouth every morning   Yes Lawrence Quintero MD   cyanocobalamin 1000 MCG/ML injection Inject 1 mL into the muscle every 30 days 6/4/24   ProviderLawrence MD       No Known Allergies    History reviewed. No pertinent family history.    Social History     Tobacco Use    Smoking status: Never    Smokeless tobacco: Never   Substance Use Topics    Alcohol use: Never         Review of Systems   Constitutional:  Positive for fatigue.   Gastrointestinal:  Negative for abdominal pain, constipation, diarrhea and vomiting.          PHYSICAL EXAM:    Vitals:    08/14/24 2315   BP: (!)  150/59   Pulse: 70   Resp: 16   Temp: 98.1 °F (36.7 °C)   SpO2: 94%       General Appearance:  lying in bed, NAD  Skin:  warm and dry, no cyanosis  Head/face:  NCAT  Lungs:  normal respiratory effort on RA  Heart:  regular rate   Abdomen:  soft, nontender, nondistended   Extremities: moving all extremities    LABS:    CBC  Recent Labs     08/15/24  0010   WBC 56.2*   HGB 12.7   HCT 36.9        BMP  Recent Labs     08/15/24  0010   *   K 3.4*   CL 86*   CO2 26   BUN 11   CREATININE 0.8   CALCIUM 7.5*     Liver Function  Recent Labs     08/14/24  1345 08/14/24  1827 08/15/24  0010   LIPASE 371*  --   --    BILITOT 5.7* 5.5* 5.3*   BILIDIR  --  3.9*  --    AST 75*  --  49*   *  --  77*   ALKPHOS 240*  --  196*     No results for input(s): \"LACTATE\" in the last 72 hours.  Recent Labs     08/14/24  1345   INR 1.7       RADIOLOGY    US ABDOMEN LIMITED    Result Date: 8/14/2024  EXAMINATION: RIGHT UPPER QUADRANT ULTRASOUND 8/14/2024 6:38 pm COMPARISON: None. HISTORY: ORDERING SYSTEM PROVIDED HISTORY: RUQ pain, r/o cholecystitis TECHNOLOGIST PROVIDED HISTORY: Reason for exam:->RUQ pain, r/o cholecystitis What reading provider will be dictating this exam?->CRC FINDINGS: LIVER:  The liver demonstrates normal echogenicity without evidence of intrahepatic biliary ductal dilatation. BILIARY SYSTEM: Cholelithiasis with edematous thickened wall measuring up to 1 mm.  Negative sonographic Cason's sign.  Common bile duct normal at 4.4 mm RIGHT KIDNEY: The right kidney is grossly unremarkable without evidence of hydronephrosis. PANCREAS:  Not visualized. OTHER: No evidence of right upper quadrant ascites.     Cholelithiasis with edematous thickened wall measuring up to 1 mm. Negative sonographic Cason's sign. Findings are suspicious for acute cholecystitis.     CT ABDOMEN PELVIS W IV CONTRAST Additional Contrast? None    Result Date: 8/14/2024  EXAMINATION: CT OF THE ABDOMEN AND PELVIS WITH CONTRAST 8/14/2024  the adjacent fat planes. There is no signs for gastric outlet obstruction or duodenal outlet obstruction. The liver has normal size.  There is a lack of contrast enhancement for the left portal vein indicating portal vein thrombosis.  There is suspect discrete attenuation of the contrast enhancement of the left lobe of the liver.  No conspicuous focal lesions seen in the right lobe of the liver. The spleen appear unremarkable.  Some fluid accumulation is seen around the spleen which is relate with findings of acute pancreatitis spread into the left anterior pararenal retroperitoneal space. There is minimal fluid accumulation in the right lateral and left lateral conal fascia extending to the pelvic area, on the right-side and relate with pancreatitis and acute cholecystitis, on the left side related with pancreatitis. Adrenals are not enlarged. Calcified atherosclerotic changes are seen in the abdominal aorta calcification in the ostium of the right left renal artery causing a component of obstruction difficult to be calculated at least of moderate to moderate-to-severe degree. There is no dissection the abdominal aorta. Kidneys are preserved size and cortical thickness.  The have preserved the cortical enhancement. Most likely there bilateral parapelvic cysts or a component of obstructive uropathy in there is a pattern of the distention of the intrarenal collecting system of both kidneys form a pattern of ureter pelvic junction obstruction or stenosis.  There is no previous studies available for comparison.  Cannot exclude a component parapelvic cysts.  This can be addition evaluated by late images fall on the present examination when contrast will be discrete in the collecting system of the kidneys.  Can consider initial correlation with the KUB which will represent a late phase of an IVP study. The bladder appears unremarkable being mild to moderate distended. The uterus has unremarkable appearance for the age

## 2024-08-15 NOTE — ED NOTES
ED to Inpatient Handoff Report    Notified accepting RN that electronic handoff available and patient ready for transport to room 733.    Safety Risks: Risk of falls    Patient in Restraints: no    Constant Observer or Patient : no    Telemetry Monitoring Ordered :Yes           Order to transfer to unit without monitor:NO    Last MEWS: 1 Time completed: 2153    Deterioration Index Score:   Predictive Model Details          34 (Caution)  Factor Value    Calculated 8/14/2024 21:59 34% Age 69 years old    Deterioration Index Model 19% Lina coma scale 14     16% Respiratory rate 20     13% WBC count abnormal (47.7 k/uL)     8% Sodium abnormal (125 mmol/L)     6% Systolic 147     3% Potassium abnormal (3.2 mmol/L)     1% Pulse oximetry 94 %     0% Temperature 98.9 °F (37.2 °C)     0% Hematocrit 41.2 %     0% Pulse 78        Vitals:    08/14/24 1228 08/14/24 2153   BP: 127/66 (!) 147/53   Pulse: 84 78   Resp: 16 20   Temp: 98.4 °F (36.9 °C) 98.9 °F (37.2 °C)   TempSrc: Oral Oral   SpO2: 98% 94%   Weight: 77.1 kg (170 lb)    Height: 1.626 m (5' 4\")          Opportunity for questions and clarification was provided.

## 2024-08-15 NOTE — PROGRESS NOTES
4 Eyes Skin Assessment     NAME:  Minerva Booth Start  YOB: 1955  MEDICAL RECORD NUMBER:  [id number]    The patient is being assessed for  Admission    I agree that at least one RN has performed a thorough Head to Toe Skin Assessment on the patient. ALL assessment sites listed below have been assessed.      Areas assessed by both nurses:    Head, Face, Ears, Shoulders, Back, Chest, Arms, Elbows, Hands, Sacrum. Buttock, Coccyx, Ischium, Legs. Feet and Heels, and Under Medical Devices         Does the Patient have a Wound? No noted wound(s)       Israel Prevention initiated by RN: No  Wound Care Orders initiated by RN: No    Pressure Injury (Stage 3,4, Unstageable, DTI, NWPT, and Complex wounds) if present, place Wound referral order by RN under : No    New Ostomies, if present place, Ostomy referral order under : No     Nurse 1 eSignature: Electronically signed by NAKIA GUTIERREZ RN on 8/15/24 at 2:20 AM EDT    **SHARE this note so that the co-signing nurse can place an eSignature**    Nurse 2 eSignature: Electronically signed by Kayden Aguiar RN on 8/15/24 at 2:21 AM EDT

## 2024-08-15 NOTE — ACP (ADVANCE CARE PLANNING)
Advance Care Planning   Healthcare Decision Maker:    Supplemental (Other) Decision Maker: Keyana garcia - Daiv Child - [phone]      PATIENT ADVISES THAT LIBIA IS A FORMER STUDENT AND FRIEND, HOWEVER AARTI ASHRAF, HER FRIEND IS HER PRIMARY CONTACT. PATIENT DOES NOT HAVE HER PHONE  WITH HER TO PROVIDE HER NUMBER. -    Click here to complete Healthcare Decision Makers including selection of the Healthcare Decision Maker Relationship (ie \"Primary\").           
Advance Care Planning   Healthcare Decision Maker:    Supplemental (Other) Decision Maker: Keyana garcia [facility] [phone]    Click here to complete Healthcare Decision Makers including selection of the Healthcare Decision Maker Relationship (ie \"Primary\").           
Abnormal Lactate

## 2024-08-15 NOTE — H&P
[facility]  History and Physical      CHIEF COMPLAINT:    Chief Complaint   Patient presents with    Fatigue     \"Think I got a virus. All I can do is lay on the bed.\" General weakness. no n/v.  No  appetite, lower oral intake, gas pains x 1 week        Patient of Dave Anderson MD presents with:  Obstructive jaundice    History of Present Illness:   Patient is a 69-year-old female without a past medical history.  Patient presents to the ED with complaints of fatigue and weakness that been ongoing for the past week.  Patient admits she has not been eating or drinking very well.  She lives by herself and she admits she has been sleeping a lot more.  She denies any chest pain shortness of breath however she does complain of some diffuse gas pain to her abdomen but no abdominal pain upon assessment.  Patient admits she feels like she has a virus due to her symptoms.  ER workup revealed sodium level 125, potassium 3.2, troponin 135, elevated liver enzymes with a total bili of 5.7, Lipase 371, WBC 47.7, imaging reveals acute pancreatitis, cholecystitis and cholelithiasis.  Patient is admitted to telemetry unit for further testing and treatment.      On my evaluation, she is quite pleasant and alert and O x 3..  She answers all questions appropriately.  She indicates she started noticing yellowing of her skin and eyes a few days ago.  She follows with Dr. Nikhil Soto regularly every 6 months.  She has not had any significant findings on her blood work as far as she knows.  After discussion  With her and explanation that we will start only with an MRCP no invasive studies until we have more information, she is agreeable to having the MRCP done    REVIEW OF SYSTEMS:  Pertinent negatives are above in HPI.  10 point ROS otherwise negative.      History reviewed. No pertinent past medical history.      History reviewed. No pertinent surgical history.    Medications Prior to Admission:    Medications Prior to  Cason sign.  Findings are suspicious for acute cholecystitis.    EKG:  I've personally reviewed the patient's EKG:  Sinus rhythm PACs    Telemetry:  I've personally reviewed the patient's telemetry:      ASSESSMENT/PLAN:  Principal Problem:    Obstructive jaundice  Active Problems:    Acute pancreatitis    Acute cholecystitis  Resolved Problems:    * No resolved hospital problems. *    69-year-old female presents to the ED with complaints of abdominal pain that is been ongoing for a week and is admitted to telemetry unit with    Painless obstructive jaundice/acute pancreatitis/acute cholecystitis  Pain control  Monitor LFTs-total bili 5.2 fall  MRCP followed by ERCP for closer look at biliary ducts/obstruction-she is now agreeable to MRCP  IV hydration for peripancreatic stranding and inflammation/consistent with acute pancreatitis  IV zosyn 3375  Monitor WBC - 56.4  Hold anticoagulation  Antiemitics  Monitor Lipase - 371>193  Strict NPO  General Surgery/GI following  MRI completed for acute confusion and delirium-unremarkable for metastatic disease    Leukocytosis of unknown significance  Has gone up to 56,000 today from 47 on arrival  Check peripheral smear  Hematology evaluation  Rule out blood disorder/dyscrasia    Medication for other comorbidities continue as appropriate dose adjustment as necessary.    DVT prophylaxis  PT OT  Discharge planning    Code status: Full  Requires inpatient level of care      I have spent a total time of 70 minutes of this patient encounter reviewing chart, labs, coordinating care with interdisciplinary teams, face to face encounter with patient, providing counseling/education to patient/family.      Eloise Suarez MD  8/15/2024

## 2024-08-15 NOTE — CONSULTS
Gastroenterology Consult Note   Eliz Carranza APRN-NP-C with Nehemias Stevens D.O. Consult Note    Date of Service: 8/15/2024  Reason for Consult: obstructive jaundice bili 5.7.  Cholecystitis and pancreatitis   Requesting Physician: Dr. Yoder     CHIEF COMPLAINT: Fatigue    History Obtained From:  patient, electronic medical record    HISTORY OF PRESENT ILLNESS:       Minerva Bell is a 69 y.o. female without significant past medical history presenting to ED for fatigue and admitted for further evaluation.  Pt reports she has had \"a virus \"x 1 week with a fever x 1 occurrence.  Denies any dysphagia, heartburn, weight loss, nausea, vomiting, abdominal pain, chest pain, shortness of breath, difficulty breathing.  Denies any new medications, over-the-counter supplements, recent antibiotic usage.  Denies use of acetaminophen or Tylenol.  Admits to a colonoscopy within 5 years unsure of findings.  Reports bowel movements are once every 2 days without any melena or hematochezia.  Admission labs sodium 125, potassium 3.2, chloride 84, lactic acid 2.2, glucose 152, calcium 9.1, troponin 135, albumin 2.9, alk phos 240, , AST 75, total bilirubin 5.7, lipase 371, WBC 47.7. Imaging CT abdomen and pelvis with IV contrast 8/14/2024: 1.  Findings for acute cholecystitis.  The gallbladder is to well distended with thickening of the wall surrounded by Anahi cholecystic fluid accumulation and inflammatory changes.  Are multiple calculi in the gallbladder lumen towards the dependent area, but there is an string of multiple small calculi in the infundibulum of the gallbladder towards the gallbladder neck indicating an obstructive process. 2.  Findings for acute edematous pancreatitis.  Diffuse enlargement of the pancreas particular in the area of the head and body surrounded by peripancreatic inflammatory process and reaction. 3.  The common bile duct is not dilated can be traced down to the level the papilla.  No  conspicuous calcifications seen in the distal common bile duct. 4.  The pancreatic ductal system is not dilated.  There is significant fat replacement of the pancreas. Preliminary report given to Dr. Yoder, ER Physician Theodore at the time of the interpretation.  Consultation for obstructive jaundice bili 5.7.  Cholecystitis and pancreatitis. Currently, pt reports feels fatigued.  Denies any nausea, vomiting, abdominal pain, chest pain, shortness of breath or difficulty breathing..  Labs today sodium 128, chloride 89, glucose 121, calcium 7.9, albumin 2.4, alk phos 231, ALT 66, AST 55, total bilirubin 5.5, lipase 193, WBC 56.4    Past Medical History:    History reviewed. No pertinent past medical history.  Past Surgical History:    History reviewed. No pertinent surgical history.  Current Medications:    Current Facility-Administered Medications: ondansetron (ZOFRAN) injection 4 mg, 4 mg, IntraVENous, Once  sodium chloride flush 0.9 % injection 5-40 mL, 5-40 mL, IntraVENous, 2 times per day  sodium chloride flush 0.9 % injection 5-40 mL, 5-40 mL, IntraVENous, PRN  0.9 % sodium chloride infusion, , IntraVENous, PRN  potassium chloride (KLOR-CON M) extended release tablet 40 mEq, 40 mEq, Oral, PRN **OR** potassium bicarb-citric acid (EFFER-K) effervescent tablet 40 mEq, 40 mEq, Oral, PRN **OR** potassium chloride 10 mEq/100 mL IVPB (Peripheral Line), 10 mEq, IntraVENous, PRN  magnesium sulfate 2000 mg in 50 mL IVPB premix, 2,000 mg, IntraVENous, PRN  ondansetron (ZOFRAN-ODT) disintegrating tablet 4 mg, 4 mg, Oral, Q8H PRN **OR** ondansetron (ZOFRAN) injection 4 mg, 4 mg, IntraVENous, Q6H PRN  bisacodyl (DULCOLAX) suppository 10 mg, 10 mg, Rectal, Daily PRN  0.9 % sodium chloride infusion, , IntraVENous, Continuous  piperacillin-tazobactam (ZOSYN) 3,375 mg in sodium chloride 0.9 % 50 mL IVPB, 3,375 mg, IntraVENous, Q8H  levothyroxine (SYNTHROID) tablet 75 mcg, 75 mcg, Oral, QAM  metoprolol succinate (TOPROL XL)  enhancement. Most likely there bilateral parapelvic cysts or a component of obstructive uropathy in there is a pattern of the distention of the intrarenal collecting system of both kidneys form a pattern of ureter pelvic junction obstruction or stenosis.  There is no previous studies available for comparison.  Cannot exclude a component parapelvic cysts.  This can be addition evaluated by late images fall on the present examination when contrast will be discrete in the collecting system of the kidneys.  Can consider initial correlation with the KUB which will represent a late phase of an IVP study. The bladder appears unremarkable being mild to moderate distended. The uterus has unremarkable appearance for the age group.  The central endometrial cavity is about 5.5 mm being borderline for the age. There is a cyst for the left ovary measuring 4.7 x 4 cm with simple cyst density.  This can be followed pelvic ultrasound.  The right ovary does not appears to be enlarged. Minimal fluid accumulation in the pelvic cavity relate with the process from the upper abdomen. Lower lung bases demonstrate no significant findings.  Areas of chronic appearing linear subsegmental atelectasis are seen predominant the diaphragma surface of the left lower lobe and lingula. Degenerative changes are seen throughout the thoracolumbar spine at multiple levels, within several disc spaces and between facet joints in between spinous process.     1.  Findings for acute cholecystitis.  The gallbladder is to well distended with thickening of the wall surrounded by Anahi cholecystic fluid accumulation and inflammatory changes.  Are multiple calculi in the gallbladder lumen towards the dependent area, but there is an string of multiple small calculi in the infundibulum of the gallbladder towards the gallbladder neck indicating an obstructive process. 2.  Findings for acute edematous pancreatitis.  Diffuse enlargement of the pancreas particular in the

## 2024-08-15 NOTE — CARE COORDINATION
Follow up in 1 year with repeat testicular U/S      Return in about 1 year around 9/7/2022    K[facility]zy carrion Procedure   Dermatology, General and Plastic Surgery, Urology Specialties   [facility] [place] [id number]     Social Work:    Advised Krystle that Minerva had a disagreement with her friend Keyana and is advising that she does not want her listed as a contact and does not want Keyana to visit. Krystle confirmed with Minerva that Alessandra Martin is her desired primary contact and was able to obtain Alessandra's phone number ([phone]). Social service noted this information in the ACP today.      Electronically signed by SIERRA Call on 8/15/2024 at 1:07 PM

## 2024-08-15 NOTE — PROGRESS NOTES
Patient:   MARISEL GALE            MRN: [id number]            FIN: [id number]              Age:   89 years     Sex:  FEMALE     :  26   Associated Diagnoses:   None   Author:   EVIN STRANGE     Subjective   Chief Complaints: AMS, weakness, Bleeding  Reason for consultation :managment of MARCO ANTONIO on CKD stage IV  Patient was seen and examined today.  Patient is more awake.  No headache.  No N/V.  No family member available.     Objective   _   VS/Measurements     Vitals between:   10-AUG-2019 14:02:48   TO   11-AUG-2019 14:02:48                   LAST RESULT MINIMUM MAXIMUM  Temperature 36.6 36.4 36.8  Heart Rate 71 70 77  NISBP           116 105 127  NIDBP           71 64 71  SpO2                    99 94 99    General:  Moderate distress, lethargic, weak, oriented x 2.    Eye:  Extraocular movements are intact, Normal conjunctiva, Vision unchanged.   HENT:  Normocephalic, Normal hearing, Oral mucosa is moist.    Neck:  Supple, Non-tender, No jugular venous distention.    Respiratory:  Breath sounds are equal, Symmetrical chest wall expansion, No chest wall tenderness, decrease breath sounds at the bases.   Cardiovascular:  Normal rate, No murmur, No gallop.    Gastrointestinal:  Soft, Non-tender, Non-distended, Normal bowel sounds.   Musculoskeletal     Normal range of motion.     Integumentary:  Warm, Dry, Pink.    Neurologic:  Alert, Oriented x 2.    Psychiatric:  Cooperative, Not appropriate mood & affect.      Health Status   Allergies:    Allergies (3) Active Reaction  clindamycin rash, urticaria  Latex None Documented  Vasotec Facial swelling    Current medications:    Medications (17) Active  Scheduled: (10)  AMIODArone 200 mg tab  200 mg 1 tab, Oral, Daily  Brimonidine 0.2% ophth soln 5 mL  1 drop, Each Eye, Q12H  Ferrous sulfate 325 mg tab [Fe 65 mg]  325 mg 1 tab, Oral, BID  Heparin flush PF 10 unit/mL inj 5 mL SDV  30 unit 3 mL, Flush, Q12H  Latanoprost 0.005% ophth soln 2.5 mL  1  Dr. Arriola notified of consult via answering service. Bhavana Mack 8/15/2024 7:12 PM TAM Olivares currently on call.    drop, Each Eye, Q Bedtime  Levothyroxine 125 mcg tab  125 mcg 1 tab, Oral, QAM at 6  Magnesium oxide 400 mg tab  400 mg 1 tab, Oral, Daily  Polyethylene glycol 3350 oral recon powder 17 gm packet UD  17 gm 1 packet, Oral, Daily  Sodium chloride PF 0.9% flush inj 10 mL  10 mL, Flush, Q12H  Timolol-dorzolamide 0.5%-2% ophth soln 10 mL  1 drop, Each Eye, BID  Continuous: (0)  PRN: (7)  Acetaminophen 500 mg tab  500 mg 1 tab, Oral, Q4H  Haloperidol 5 mg/1 mL inj SDV  2 mg 0.4 mL, IM, Q8H  Hydrocodone-acetaminophen 5-325 mg tab  1 tab, Oral, Q6H  Morphine PF 2 mg/1 mL inj SDV  2 mg 1 mL, IV Push, Q4H  Ondansetron 4 mg/2 mL inj SDV  4 mg 2 mL, Slow IV Push, Q4H  Sodium chloride PF 0.9% flush inj 10 mL  10 mL, Flush, As Directed PRN  Sodium chloride PF 0.9% flush inj 10 mL  20 mL, Flush, As Directed PRN      Results Review   Labs between:  10-AUG-2019 14:02 to 11-AUG-2019 14:02  CBC:                 WBC  HgB  Hct  Plt  MCV  RDW   11-AUG-2019 9.0  (L) 7.3  (L) 23.6  (L) 137  92.9  (H) 15.6   10-AUG-2019   (!) 6.9  (L) 22.2         DIFF:                 Seg  Neutroph//ABS  Lymph//ABS  Mono//ABS  EOS/ABS  11-AUG-2019 NOT APPLICABLE  76 // 6.8 13 // 1.1 7 // 0.6 3 // 0.3  BMP:                 Na  Cl  BUN  Glu   11-AUG-2019 140  106  (H) 39  (H) 113                              K  CO2  Cr  Ca                              4.6  29  (H) 5.35  (L) 8.2   Other Chem:             Mg  Phos  Triglycerides  GGTP  DirectBili                           2.1  4.1                              Impression and Plan   Acute kidney injury on chronic kidney disease  -Chronic kidney disease stage IV at baseline  -Patient and son are requesting HD  -HD on hold this weekend  -Follow up I/O  -Daily RFP  -Daily evaluation for the need of RRT  Cardiovascular system  -Cardiology consulted  -BP is acceptable  -Hemorrhagic shock upon presenation  -Following I/O  -Daily RFP  Bleeding  -Following with surgical team  -Hb is 7.3 g/dl  -Will consider  JEFFREY  Hypothyroidism  -On Synthroid.  Hyperlipidemia  -On statin  -Continue current management  Thank you very much for consulting us.  Will follow up on daily bases.   Jr Arriaga MD, MSCR  [facility]

## 2024-08-16 ENCOUNTER — APPOINTMENT (OUTPATIENT)
Dept: GENERAL RADIOLOGY | Age: 69
DRG: 871 | End: 2024-08-16
Payer: MEDICARE

## 2024-08-16 ENCOUNTER — APPOINTMENT (OUTPATIENT)
Age: 69
DRG: 871 | End: 2024-08-16
Attending: INTERNAL MEDICINE
Payer: MEDICARE

## 2024-08-16 PROBLEM — R06.82 TACHYPNEA: Status: ACTIVE | Noted: 2024-08-16

## 2024-08-16 LAB
ALBUMIN SERPL-MCNC: 2.2 G/DL (ref 3.5–5.2)
ALP SERPL-CCNC: 238 U/L (ref 35–104)
ALT SERPL-CCNC: 42 U/L (ref 0–32)
ANION GAP SERPL CALCULATED.3IONS-SCNC: 12 MMOL/L (ref 7–16)
AST SERPL-CCNC: 35 U/L (ref 0–31)
BASOPHILS # BLD: 0 K/UL (ref 0–0.2)
BASOPHILS NFR BLD: 0 % (ref 0–2)
BILIRUB SERPL-MCNC: 3.5 MG/DL (ref 0–1.2)
BNP SERPL-MCNC: 4128 PG/ML (ref 0–125)
BUN SERPL-MCNC: 13 MG/DL (ref 6–23)
CALCIUM SERPL-MCNC: 7.5 MG/DL (ref 8.6–10.2)
CHLORIDE SERPL-SCNC: 92 MMOL/L (ref 98–107)
CO2 SERPL-SCNC: 25 MMOL/L (ref 22–29)
CREAT SERPL-MCNC: 0.7 MG/DL (ref 0.5–1)
ECHO AO ASC DIAM: 3.9 CM
ECHO AO ASCENDING AORTA INDEX: 1.94 CM/M2
ECHO AV AREA PEAK VELOCITY: 2.7 CM2
ECHO AV AREA VTI: 3 CM2
ECHO AV AREA/BSA PEAK VELOCITY: 1.3 CM2/M2
ECHO AV AREA/BSA VTI: 1.5 CM2/M2
ECHO AV CUSP MM: 2 CM
ECHO AV MEAN GRADIENT: 5 MMHG
ECHO AV MEAN VELOCITY: 1.1 M/S
ECHO AV PEAK GRADIENT: 11 MMHG
ECHO AV PEAK VELOCITY: 1.7 M/S
ECHO AV VELOCITY RATIO: 0.88
ECHO AV VTI: 26.7 CM
ECHO BSA: 2.07 M2
ECHO LA DIAMETER INDEX: 1.79 CM/M2
ECHO LA DIAMETER: 3.6 CM
ECHO LA VOL A-L A2C: 51 ML (ref 22–52)
ECHO LA VOL A-L A4C: 43 ML (ref 22–52)
ECHO LA VOL MOD A2C: 52 ML (ref 22–52)
ECHO LA VOL MOD A4C: 43 ML (ref 22–52)
ECHO LA VOLUME AREA LENGTH: 47 ML
ECHO LA VOLUME INDEX A-L A2C: 25 ML/M2 (ref 16–34)
ECHO LA VOLUME INDEX A-L A4C: 21 ML/M2 (ref 16–34)
ECHO LA VOLUME INDEX AREA LENGTH: 23 ML/M2 (ref 16–34)
ECHO LA VOLUME INDEX MOD A2C: 26 ML/M2 (ref 16–34)
ECHO LA VOLUME INDEX MOD A4C: 21 ML/M2 (ref 16–34)
ECHO LV E' LATERAL VELOCITY: 5 CM/S
ECHO LV E' SEPTAL VELOCITY: 5 CM/S
ECHO LV FRACTIONAL SHORTENING: 36 % (ref 28–44)
ECHO LV INTERNAL DIMENSION DIASTOLE INDEX: 2.09 CM/M2
ECHO LV INTERNAL DIMENSION DIASTOLIC: 4.2 CM (ref 3.9–5.3)
ECHO LV INTERNAL DIMENSION SYSTOLIC INDEX: 1.34 CM/M2
ECHO LV INTERNAL DIMENSION SYSTOLIC: 2.7 CM
ECHO LV ISOVOLUMETRIC RELAXATION TIME (IVRT): 78.4 MS
ECHO LV IVSD: 1.2 CM (ref 0.6–0.9)
ECHO LV IVSS: 1.5 CM
ECHO LV MASS 2D: 167.4 G (ref 67–162)
ECHO LV MASS INDEX 2D: 83.3 G/M2 (ref 43–95)
ECHO LV POSTERIOR WALL DIASTOLIC: 1.1 CM (ref 0.6–0.9)
ECHO LV POSTERIOR WALL SYSTOLIC: 1.4 CM
ECHO LV RELATIVE WALL THICKNESS RATIO: 0.52
ECHO LVOT AREA: 3.1 CM2
ECHO LVOT AV VTI INDEX: 0.97
ECHO LVOT DIAM: 2 CM
ECHO LVOT MEAN GRADIENT: 4 MMHG
ECHO LVOT PEAK GRADIENT: 9 MMHG
ECHO LVOT PEAK VELOCITY: 1.5 M/S
ECHO LVOT STROKE VOLUME INDEX: 40.5 ML/M2
ECHO LVOT SV: 81.3 ML
ECHO LVOT VTI: 25.9 CM
ECHO MV "A" WAVE DURATION: 110.7 MSEC
ECHO MV A VELOCITY: 0.76 M/S
ECHO MV AREA PHT: 4.2 CM2
ECHO MV AREA VTI: 5 CM2
ECHO MV E DECELERATION TIME (DT): 195.5 MS
ECHO MV E VELOCITY: 0.64 M/S
ECHO MV E/A RATIO: 0.84
ECHO MV E/E' LATERAL: 12.8
ECHO MV E/E' RATIO (AVERAGED): 12.8
ECHO MV E/E' SEPTAL: 12.8
ECHO MV LVOT VTI INDEX: 0.63
ECHO MV MAX VELOCITY: 0.8 M/S
ECHO MV MEAN GRADIENT: 1 MMHG
ECHO MV MEAN VELOCITY: 0.5 M/S
ECHO MV PEAK GRADIENT: 3 MMHG
ECHO MV PRESSURE HALF TIME (PHT): 51.9 MS
ECHO MV VTI: 16.4 CM
ECHO PV MAX VELOCITY: 1.4 M/S
ECHO PV MEAN GRADIENT: 4 MMHG
ECHO PV MEAN VELOCITY: 0.9 M/S
ECHO PV PEAK GRADIENT: 7 MMHG
ECHO PV VTI: 19.4 CM
ECHO PVEIN A DURATION: 106.1 MS
ECHO PVEIN A VELOCITY: 0.3 M/S
ECHO PVEIN PEAK D VELOCITY: 0.4 M/S
ECHO PVEIN PEAK S VELOCITY: 0.5 M/S
ECHO PVEIN S/D RATIO: 1.3
ECHO RV INTERNAL DIMENSION: 3.1 CM
ECHO RV TAPSE: 2.2 CM (ref 1.7–?)
EKG ATRIAL RATE: 122 BPM
EKG Q-T INTERVAL: 292 MS
EKG QRS DURATION: 80 MS
EKG QTC CALCULATION (BAZETT): 477 MS
EKG R AXIS: 25 DEGREES
EKG T AXIS: 166 DEGREES
EKG VENTRICULAR RATE: 161 BPM
EOSINOPHIL # BLD: 0 K/UL (ref 0.05–0.5)
EOSINOPHILS RELATIVE PERCENT: 0 % (ref 0–6)
ERYTHROCYTE [DISTWIDTH] IN BLOOD BY AUTOMATED COUNT: 14.4 % (ref 11.5–15)
GFR, ESTIMATED: >90 ML/MIN/1.73M2
GLUCOSE BLD-MCNC: 98 MG/DL (ref 74–99)
GLUCOSE SERPL-MCNC: 114 MG/DL (ref 74–99)
HBA1C MFR BLD: 5.6 % (ref 4–5.6)
HCT VFR BLD AUTO: 35.8 % (ref 34–48)
HGB BLD-MCNC: 11.7 G/DL (ref 11.5–15.5)
LYMPHOCYTES NFR BLD: 0.97 K/UL (ref 1.5–4)
LYMPHOCYTES RELATIVE PERCENT: 2 % (ref 20–42)
MAGNESIUM SERPL-MCNC: 2.1 MG/DL (ref 1.6–2.6)
MCH RBC QN AUTO: 29 PG (ref 26–35)
MCHC RBC AUTO-ENTMCNC: 32.7 G/DL (ref 32–34.5)
MCV RBC AUTO: 88.8 FL (ref 80–99.9)
MONOCYTES NFR BLD: 1.46 K/UL (ref 0.1–0.95)
MONOCYTES NFR BLD: 3 % (ref 2–12)
MYELOCYTES ABSOLUTE COUNT: 0.49 K/UL
MYELOCYTES: 1 %
NEUTROPHILS NFR BLD: 95 % (ref 43–80)
NEUTS SEG NFR BLD: 52.89 K/UL (ref 1.8–7.3)
PATH REV BLD -IMP: NORMAL
PLATELET # BLD AUTO: 365 K/UL (ref 130–450)
PMV BLD AUTO: 10.2 FL (ref 7–12)
POTASSIUM SERPL-SCNC: 3.4 MMOL/L (ref 3.5–5)
PROT SERPL-MCNC: 6.3 G/DL (ref 6.4–8.3)
RBC # BLD AUTO: 4.03 M/UL (ref 3.5–5.5)
RBC # BLD: ABNORMAL 10*6/UL
SODIUM SERPL-SCNC: 129 MMOL/L (ref 132–146)
TROPONIN I SERPL HS-MCNC: 118 NG/L (ref 0–9)
WBC # BLD: ABNORMAL 10*3/UL
WBC OTHER # BLD: 55.8 K/UL (ref 4.5–11.5)

## 2024-08-16 PROCEDURE — 6360000002 HC RX W HCPCS: Performed by: NURSE PRACTITIONER

## 2024-08-16 PROCEDURE — 6370000000 HC RX 637 (ALT 250 FOR IP): Performed by: NURSE PRACTITIONER

## 2024-08-16 PROCEDURE — 83880 ASSAY OF NATRIURETIC PEPTIDE: CPT

## 2024-08-16 PROCEDURE — 2580000003 HC RX 258: Performed by: NURSE PRACTITIONER

## 2024-08-16 PROCEDURE — 99291 CRITICAL CARE FIRST HOUR: CPT | Performed by: INTERNAL MEDICINE

## 2024-08-16 PROCEDURE — 93306 TTE W/DOPPLER COMPLETE: CPT | Performed by: INTERNAL MEDICINE

## 2024-08-16 PROCEDURE — 6360000002 HC RX W HCPCS: Performed by: INTERNAL MEDICINE

## 2024-08-16 PROCEDURE — 93005 ELECTROCARDIOGRAM TRACING: CPT | Performed by: INTERNAL MEDICINE

## 2024-08-16 PROCEDURE — 80053 COMPREHEN METABOLIC PANEL: CPT

## 2024-08-16 PROCEDURE — 83735 ASSAY OF MAGNESIUM: CPT

## 2024-08-16 PROCEDURE — 2500000003 HC RX 250 WO HCPCS: Performed by: INTERNAL MEDICINE

## 2024-08-16 PROCEDURE — 85025 COMPLETE CBC W/AUTO DIFF WBC: CPT

## 2024-08-16 PROCEDURE — C8929 TTE W OR WO FOL WCON,DOPPLER: HCPCS

## 2024-08-16 PROCEDURE — 84484 ASSAY OF TROPONIN QUANT: CPT

## 2024-08-16 PROCEDURE — 93010 ELECTROCARDIOGRAM REPORT: CPT | Performed by: INTERNAL MEDICINE

## 2024-08-16 PROCEDURE — 71045 X-RAY EXAM CHEST 1 VIEW: CPT

## 2024-08-16 PROCEDURE — 6360000004 HC RX CONTRAST MEDICATION: Performed by: INTERNAL MEDICINE

## 2024-08-16 PROCEDURE — 82962 GLUCOSE BLOOD TEST: CPT

## 2024-08-16 PROCEDURE — 83036 HEMOGLOBIN GLYCOSYLATED A1C: CPT

## 2024-08-16 PROCEDURE — 2060000000 HC ICU INTERMEDIATE R&B

## 2024-08-16 PROCEDURE — 99233 SBSQ HOSP IP/OBS HIGH 50: CPT | Performed by: SURGERY

## 2024-08-16 RX ORDER — METOPROLOL SUCCINATE 50 MG/1
50 TABLET, EXTENDED RELEASE ORAL EVERY MORNING
Status: DISCONTINUED | OUTPATIENT
Start: 2024-08-17 | End: 2024-08-19

## 2024-08-16 RX ORDER — METOPROLOL TARTRATE 1 MG/ML
5 INJECTION, SOLUTION INTRAVENOUS ONCE
Status: COMPLETED | OUTPATIENT
Start: 2024-08-16 | End: 2024-08-16

## 2024-08-16 RX ORDER — DIGOXIN 0.25 MG/ML
500 INJECTION INTRAMUSCULAR; INTRAVENOUS ONCE
Status: COMPLETED | OUTPATIENT
Start: 2024-08-16 | End: 2024-08-16

## 2024-08-16 RX ORDER — METOPROLOL TARTRATE 1 MG/ML
INJECTION, SOLUTION INTRAVENOUS
Status: DISPENSED
Start: 2024-08-16 | End: 2024-08-16

## 2024-08-16 RX ADMIN — PIPERACILLIN AND TAZOBACTAM 3375 MG: 3; .375 INJECTION, POWDER, LYOPHILIZED, FOR SOLUTION INTRAVENOUS at 05:01

## 2024-08-16 RX ADMIN — METOPROLOL SUCCINATE 25 MG: 25 TABLET, EXTENDED RELEASE ORAL at 07:56

## 2024-08-16 RX ADMIN — PERFLUTREN 1.5 ML: 6.52 INJECTION, SUSPENSION INTRAVENOUS at 10:50

## 2024-08-16 RX ADMIN — PIPERACILLIN AND TAZOBACTAM 3375 MG: 3; .375 INJECTION, POWDER, LYOPHILIZED, FOR SOLUTION INTRAVENOUS at 13:19

## 2024-08-16 RX ADMIN — POTASSIUM CHLORIDE 40 MEQ: 1500 TABLET, EXTENDED RELEASE ORAL at 11:24

## 2024-08-16 RX ADMIN — SODIUM CHLORIDE: 9 INJECTION, SOLUTION INTRAVENOUS at 05:03

## 2024-08-16 RX ADMIN — METOPROLOL TARTRATE 5 MG: 5 INJECTION INTRAVENOUS at 07:57

## 2024-08-16 RX ADMIN — DIGOXIN 500 MCG: 0.25 INJECTION INTRAMUSCULAR; INTRAVENOUS at 08:21

## 2024-08-16 RX ADMIN — PANTOPRAZOLE SODIUM 40 MG: 40 INJECTION, POWDER, FOR SOLUTION INTRAVENOUS at 11:24

## 2024-08-16 RX ADMIN — SODIUM CHLORIDE: 9 INJECTION, SOLUTION INTRAVENOUS at 19:41

## 2024-08-16 RX ADMIN — PIPERACILLIN AND TAZOBACTAM 3375 MG: 3; .375 INJECTION, POWDER, LYOPHILIZED, FOR SOLUTION INTRAVENOUS at 21:32

## 2024-08-16 NOTE — CARE COORDINATION
Updated plan of care. Pt RRT this am for atrial fibrillation RVR rate in 200's. Given iv lopressor and iv digoxin. Pt to transfer to intermediate unit. Pt independent prior to admit. Has DME if needed. Plan home with no needs-claudia

## 2024-08-16 NOTE — SIGNIFICANT EVENT
RRT note     RRT was called due to AMS and dyspnea  Upon arrival, patient is sitting up in bed, slightly tachypneic at this time.  Heart rate 120s to 150s.  /69.  Saturating 97% on room air    Patient is alert and oriented x 3 now  Denies any complaints  Seen and evaluated by cardiology earlier, plan to transfer to a telemetry unit and start Cardizem drip if BP improves.  BP is good now, advised RN to repeat BP in half an hour and if it remains okay/elevated then possibly starting Cardizem bolus and drip.    Patient with significant leukocytosis of 55.8 this morning.  Concern for acute cholecystitis, likely contributing to intermittent mental status changes.  Patient had an MRI of the brain done yesterday which was unremarkable.    Ordered a chest x-ray for dyspnea, will follow    Patient for transfer to higher level of care floor  Patient left in stable condition    CCT 34 mins

## 2024-08-16 NOTE — PROGRESS NOTES
GI placed in note okay to advance diet as tolerated, per pt request message sent to gen surg requesting diet order, reply stated to defer to GI. Clear liquid diet initiated.

## 2024-08-16 NOTE — PROGRESS NOTES
4 Eyes Skin Assessment     NAME:  Minerva Booth Start  YOB: 1955  MEDICAL RECORD NUMBER:  [id number]    The patient is being assessed for  Admission    I agree that at least one RN has performed a thorough Head to Toe Skin Assessment on the patient. ALL assessment sites listed below have been assessed.      Areas assessed by both nurses:    Head, Face, Ears, Shoulders, Back, Chest, Arms, Elbows, Hands, Sacrum. Buttock, Coccyx, Ischium, Legs. Feet and Heels, and Under Medical Devices         Does the Patient have a Wound? No noted wound(s)       Israel Prevention initiated by RN: No  Wound Care Orders initiated by RN: No    Pressure Injury (Stage 3,4, Unstageable, DTI, NWPT, and Complex wounds) if present, place Wound referral order by RN under : No    New Ostomies, if present place, Ostomy referral order under : No     Nurse 1 eSignature: Electronically signed by Makenzie Madera RN on 8/16/24 at 1:27 PM EDT    **SHARE this note so that the co-signing nurse can place an eSignature**    Nurse 2 eSignature: Electronically signed by Janelle Ewing RN on 8/16/24 at 6:37 PM EDT

## 2024-08-16 NOTE — PROGRESS NOTES
Melissa arriaga notified that cmr called while patient in echo, appears in a fib, heart rate went up to 200s. Echo is sending patient back to room. Was stable during the night, but has been pulling off monitor. She notified dr araiza

## 2024-08-16 NOTE — PROGRESS NOTES
INPATIENT CARDIOLOGY FOLLOW-UP    Name: Minerva Booth Start    Age: 69 y.o.    Date of Admission: 8/14/2024  1:02 PM    Date of Service: 8/16/2024    Primary Cardiologist: None, known to me from this admission    Chief Complaint: Follow-up for elevated troponin    Interim History:  I was contacted by nursing this morning, patient went down for echo this morning and was found to be in A-fib RVR with severely elevated heart rates, she was then sent back to 7 S.  I urgently came to the bedside to evaluate the patient.      Apparently patient had ripped off her monitor overnight.  Was in sinus yesterday.  She was off the monitor from 2049-333.  Briefly back on monitor from 333-351 am at which time she was still in sinus.  When she was placed back on the monitor at 735 this morning she was in A-fib with RVR with heart rates in excess of 160s.    Denies awareness of being out of rhythm, denies chest pain shortness of breath palpitations.  No prior history of AF.  Also denies abdominal pain.    MRCP yesterday showed acute pancreatitis and cholecystitis.    Review of Systems:   Negative except as described above    Problem List:  Patient Active Problem List   Diagnosis    Obstructive jaundice    Acute pancreatitis    Acute cholecystitis    Painless jaundice       Current Medications:    Current Facility-Administered Medications:     metoprolol (LOPRESSOR) 5 MG/5ML injection, , , ,     LORazepam (ATIVAN) injection 1 mg, 1 mg, IntraVENous, Q6H PRN, Eloise Suarez MD, 1 mg at 08/15/24 1528    ondansetron (ZOFRAN) injection 4 mg, 4 mg, IntraVENous, Once, Marquita Yoder MD    sodium chloride flush 0.9 % injection 5-40 mL, 5-40 mL, IntraVENous, 2 times per day, Kellen, April, APRN - CNP, 10 mL at 08/14/24 2330    sodium chloride flush 0.9 % injection 5-40 mL, 5-40 mL, IntraVENous, PRN, Kellen, April, APRN - CNP    0.9 % sodium chloride infusion, , IntraVENous, PRN, Kellen, April, APRN - CNP, Last

## 2024-08-16 NOTE — CONSULTS
[facility]  Hematology/Oncology  Consult      Patient Name: Minerva Bell  YOB: 1955  PCP: Dave Anderson MD   Referring Provider:      Reason for Consultation:   Chief Complaint   Patient presents with    Fatigue     \"Think I got a virus. All I can do is lay on the bed.\" General weakness. no n/v.  No  appetite, lower oral intake, gas pains x 1 week        History of Present Illness: This is a 69-year-old female patient with no PMH on file who presented to the ED for evaluation of fatigue and weakness. Chest xray showed no acute process with mild cardiomegaly. CT A/P showed findings for acute cholecystitis.  The gallbladder is  well distended with thickening of the wall surrounded by Anahi cholecystic fluid accumulation and inflammatory changes.  There are multiple calculi in the gallbladder lumen towards the dependent area, but there is an string of multiple small calculi in the infundibulum of the gallbladder towards the gallbladder neck indicating an obstructive process. Findings for acute edematous pancreatitis.  Diffuse enlargement of the pancreas particular in the area of the head and body surrounded by peripancreatic inflammatory process and reaction. The common bile duct is not dilated can be traced down to the level the papilla.  No conspicuous calcifications seen in the distal common bile duct.  The pancreatic ductal system is not dilated.  There is significant fat replacement of the pancreas. Abdominal US showed Cholelithiasis with edematous thickened wall measuring up to 1 mm. Negative sonographic Cason's sign. Findings are suspicious for acute cholecystitis. MRCP showed acute interstitial edematous pancreatitis. No acute necrotic collection with secondary inflammation of the adjacent duodenal 2nd portion.  No evidence for pancreas divisum anatomy. Acute cholecystitis. Cholelithiasis.  No evidence for choledocholithiasis. Trace to small ascites. Trace bilateral  pleural effusions right greater than left. General surgery consulted patient on Zosyn and final surgical recommendations following. Cardiology consulted for elevated troponin's. ECHO pending.  RRT was called recently for AMS MRI brain showed no acute intracranial abnormality. No acute infarct. Heart rate was in the 120 to 150's. To start possible Cardizem drip. CMP with Na 129, K 3.4, Ca 7.5. ProBNP 4,128; troponin 118. LFT's with Alk Phos 238, ALT 42, AST 35, total bili 3.5. CBC with WBC's 55.8 , ANC 52.98, AMC 1.46. Myelocyte 1%. Peripheral smear pending. Consultation for leukocytosis.            Diagnostic Data:     History reviewed. No pertinent past medical history.    Patient Active Problem List    Diagnosis Date Noted    Painless jaundice 08/15/2024    Obstructive jaundice 08/14/2024    Acute pancreatitis 08/14/2024    Acute cholecystitis 08/14/2024        History reviewed. No pertinent surgical history.    Family History  History reviewed. No pertinent family history.    Social History    TOBACCO:   reports that she has never smoked. She has never used smokeless tobacco.  ETOH:   reports no history of alcohol use.    Home Medications  Prior to Admission medications    Medication Sig Start Date End Date Taking? Authorizing Provider   hydroCHLOROthiazide (HYDRODIURIL) 25 MG tablet Take 1 tablet by mouth every morning   Yes Lawrence Quintero MD   levothyroxine (SYNTHROID) 75 MCG tablet Take 1 tablet by mouth every morning   Yes Lawrence Quintero MD   metoprolol succinate (TOPROL XL) 25 MG extended release tablet Take 1 tablet by mouth every morning   Yes Lawrence Quintero MD   cyanocobalamin 1000 MCG/ML injection Inject 1 mL into the muscle every 30 days 6/4/24   Lawrence Quintero MD       Allergies  No Known Allergies    Review of Systems:    As in HPI above, otherwise negative.       Objective  BP (!) 167/69   Pulse (!) 133   Temp 98.2 °F (36.8 °C) (Oral)   Resp 29   Ht 1.626 m (5' 4\")    dilated can be traced down to the level the papilla.  No conspicuous calcifications seen in the distal common bile duct. 4.  The pancreatic ductal system is not dilated.  There is significant fat replacement of the pancreas. Preliminary report given to Dr. Yoder,  Physician [place] at the time of the interpretation.     XR CHEST PORTABLE    Result Date: 8/14/2024  EXAMINATION: ONE XRAY VIEW OF THE CHEST 8/14/2024 2:04 pm COMPARISON: None. HISTORY: ORDERING SYSTEM PROVIDED HISTORY: sob TECHNOLOGIST PROVIDED HISTORY: Reason for exam:->sob FINDINGS: The lungs are without acute focal process.  There is no effusion or pneumothorax. The cardiomediastinal silhouette is without acute process. The osseous structures are without acute process.  Mild elevation of right hemidiaphragm.     No acute process. Mild cardiomegaly.         ASSESSMENT/PLAN :  69-year-old female   - no PMH on file    - Fatigue and weakness.   - Chest xray showed no acute process with mild cardiomegaly.   - CT A/P showed findings for acute cholecystitis.  The gallbladder is to well distended with thickening of the wall surrounded by Anahi cholecystic fluid accumulation and inflammatory changes.  Are multiple calculi in the gallbladder lumen towards the dependent area, but there is an string of multiple small calculi in the infundibulum of the gallbladder towards the gallbladder neck indicating an obstructive process. Findings for acute edematous pancreatitis.  Diffuse enlargement of the pancreas particular in the area of the head and body surrounded by peripancreatic inflammatory process and reaction. The common bile duct is not dilated can be traced down to the level the papilla.  No conspicuous calcifications seen in the distal common bile duct.  The pancreatic ductal system is not dilated.  There is significant fat replacement of the pancreas.   - Abdominal US showed Cholelithiasis with edematous thickened wall measuring up to 1 mm. Negative

## 2024-08-16 NOTE — PROGRESS NOTES
GENERAL SURGERY  DAILY PROGRESS NOTE    Patient's Name/Date of Birth: Minerva Booth Start / 1955    Date: 2024     Chief Complaint   Patient presents with    Fatigue     \"Think I got a virus. All I can do is lay on the bed.\" General weakness. no n/v.  No  appetite, lower oral intake, gas pains x 1 week        Subjective:  ***      Objective:  Last 24Hrs  Temp  Av.1 °F (36.7 °C)  Min: 97.7 °F (36.5 °C)  Max: 98.8 °F (37.1 °C)  Resp  Av.2  Min: 16  Max: 18  Pulse  Av.5  Min: 75  Max: 94  Systolic (24hrs), Av , Min:129 , Max:172     Diastolic (24hrs), Av, Min:59, Max:67    SpO2  Av.5 %  Min: 95 %  Max: 96 %    I/O last 3 completed shifts:  In: 768.4 [I.V.:751.2; IV Piggyback:17.2]  Out: 600 [Urine:600]      General: resting in bed  Cardiovascular: Warm throughout, no edema  Respiratory: no respiratory distress, equal chest rise  Abdomen: soft, *** nontender, nondistended  Skin: no obvious rashes or lesions appreciated, no jaundice  Extremities: moving all extremities      CBC  Recent Labs     08/15/24  0527 08/15/24  1915 24  0335   WBC 56.4* 63.0* 55.8*   RBC 4.29 4.21 4.03   HGB 12.6 12.4 11.7   HCT 37.9 36.8 35.8   MCV 88.3 87.4 88.8   MCH 29.4 29.5 29.0   MCHC 33.2 33.7 32.7   RDW 14.2 14.4 14.4    416 365   MPV 10.2 10.0 10.2       CMP  Recent Labs     24  1345 24  1827 08/15/24  0010 08/15/24  0527   *  --  125* 128*   K 3.2*  --  3.4* 3.7   CL 84*  --  86* 89*   CO2 27  --  26 26   BUN 12  --  11 10   CREATININE 0.8  --  0.8 0.9   GLUCOSE 152*  --  137* 121*   CALCIUM 8.1*  --  7.5* 7.9*   BILITOT 5.7* 5.5* 5.3* 5.2*   ALKPHOS 240*  --  196* 231*   AST 75*  --  49* 55*   *  --  77* 66*         Assessment/Plan:    Patient Active Problem List   Diagnosis    Obstructive jaundice    Acute pancreatitis    Acute cholecystitis    Painless jaundice       69 y.o. female with gallstone pancreatitis      - NPO with IVF, possibly advance

## 2024-08-16 NOTE — PROGRESS NOTES
Dr thomas on floor, ordered iv lopressor x1  and said to administer po toprol xl  as well . He spoke to dr araiza to update

## 2024-08-16 NOTE — PROGRESS NOTES
PROGRESS NOTE    Patient Presents with/Seen in Consultation For      Reason for Consult: obstructive jaundice bili 5.7.  Cholecystitis and pancreatitis      CHIEF COMPLAINT: Fatigue    Subjective:     Patient seen laying in bed, no acute distress.  States she feels well.  Denies any nausea vomiting and/or pain.  Patient agreeable to MRI/MRCP today.  RRT notes reviewed.  Plan of care reviewed all questions answered.  No family present at this time.    Review of Systems  Aside from what was mentioned in the PMH and HPI, essentially unremarkable, all others negative.    Objective:     Patient Vitals for the past 8 hrs:   BP Temp Temp src Pulse Resp SpO2   08/16/24 1004 -- -- -- (!) 105 -- --   08/16/24 0944 -- -- -- 96 -- --   08/16/24 0943 -- -- -- 97 -- --   08/16/24 0930 127/77 -- -- (!) 150 (!) 36 --   08/16/24 0851 (!) 150/97 98.2 °F (36.8 °C) Oral (!) 147 (!) 36 96 %   08/16/24 0844 (!) 167/69 98.2 °F (36.8 °C) Oral (!) 133 29 --   08/16/24 0830 (!) 119/59 -- -- (!) 167 30 95 %   08/16/24 0821 108/68 -- -- (!) 149 30 --   08/16/24 0806 106/70 97.5 °F (36.4 °C) Oral (!) 140 20 96 %   08/16/24 0800 125/73 -- -- (!) 146 -- --   08/16/24 0750 (!) 137/52 -- -- (!) 156 -- 96 %   08/16/24 0745 (!) 141/104 -- -- (!) 172 -- --   08/16/24 0500 -- -- -- -- -- 90 %   08/16/24 0338 (!) 151/59 98.8 °F (37.1 °C) Oral 78 16 --       General appearance: alert, awake, laying in bed, and cooperative, ill-appearing, obese, slightly tachypneic  Eyes: conjunctivae/corneas clear. PERRL.  Lungs: clear to auscultation bilaterally  Heart: regular rate and rhythm, no murmur, 2+ pulses; trace edema  Abdomen: soft, non-tender; bowel sounds normal; no masses  Extremities: Trace edema  Pulses: 2+ and symmetric  Skin: Skin color, texture, turgor normal.   Neurologic: Grossly normal    metoprolol (LOPRESSOR) 5 MG/5ML injection,   LORazepam (ATIVAN) injection 1 mg, Q6H PRN  ondansetron (ZOFRAN) injection 4 mg, Once  sodium chloride flush 0.9 %  8/15/2024 4:25 pm TECHNIQUE: Multiplanar multisequence MRI of the brain was performed without the administration of intravenous contrast. COMPARISON: None. HISTORY: ORDERING SYSTEM PROVIDED HISTORY: ams TECHNOLOGIST PROVIDED HISTORY: Reason for exam:->ams FINDINGS: INTRACRANIAL STRUCTURES/VENTRICLES: There is no sign of acute infarct. No mass effect or midline shift. No evidence of an acute intracranial hemorrhage. There is moderate dilatation of the ventricles and sulci representing age-appropriate atrophy. There is minimal periventricular and subcortical white matter T2 signal abnormality representing age-appropriate small vessel ischemia. The sellar/suprasellar regions appear unremarkable. The normal signal voids within the major intracranial vessels appear maintained. ORBITS: The visualized portion of the orbits demonstrate no acute abnormality. SINUSES: The visualized paranasal sinuses and mastoid air cells demonstrate no acute abnormality. BONES/SOFT TISSUES: The bone marrow signal intensity appears normal. The soft tissues demonstrate no acute abnormality.     1. No acute intracranial abnormality. No acute infarct. 2. Moderate age-appropriate atrophy with minimal age-appropriate small vessel ischemia.     Assessment:   Hyperbilirubinemia  Elevated LFTs  Leukocytosis-defer  Pancreatitis  Cholecystitis- per CT   Electrolyte abnormality-defer  Elevated lipase  Lactic acidosis-resolved    Plan:   Patient transferring to telemetry floor, per cardiology  KRISTI per cardiology  Protonix 40 mg daily  RRT events noted from today  Lipase today and daily  Chest x-ray noted above  MRI brain noted above  MRI/MRCP WO abdomen - noted above  Medical management per primary care team  Diet as tolerated  Supportive care  Trend labs  Will follow    Discussed with Dr. Stevens  Plan per Dr. Hilda FAULKNER, NP-C 8/16/2024 9:15 AM AM For Dr. Stevens    GI attending addendum:  The patient case was reviewed and discussed with

## 2024-08-16 NOTE — PROGRESS NOTES
Responded to RRT and offered silent prayer while the patient was receiving care.  remains vailable for support if desired and or needed.

## 2024-08-16 NOTE — PROGRESS NOTES
[facility]                                Progress note    Subjective:    Resting comfortably in bed  Denies any pain on assessment  She is alert and oriented  Undergoing echo    Objective:    BP (!) 151/59   Pulse 85   Temp 98.1 °F (36.7 °C) (Oral)   Resp 20   Ht 1.626 m (5' 4\")   Wt 91.6 kg (201 lb 15.1 oz)   SpO2 96%   BMI 32.59 kg/m²     In: -   Out: 700   In: -   Out: 700 [Urine:700]    General appearance: NAD, conversant, some lethargy however able to carry on conversation  HEENT: AT/NC, MMM  Neck: FROM, supple  Lungs: Clear to auscultation  CV: Irregular, tachycardia,   Vasc: Radial pulses 2+  Abdomen, non-tender; no masses or HSM, mildly distended  Extremities: No peripheral edema or digital cyanosis  Skin: no rash, lesions or ulcers  Psych: Alert and oriented to person, place and time  Neuro: Alert and interactive     Recent Labs     08/15/24  0527 08/15/24  1915 08/16/24  0335   WBC 56.4* 63.0* 55.8*   HGB 12.6 12.4 11.7   HCT 37.9 36.8 35.8    416 365       Recent Labs     08/15/24  0010 08/15/24  0527 08/16/24  0335   * 128* 129*   K 3.4* 3.7 3.4*   CL 86* 89* 92*   CO2 26 26 25   BUN 11 10 13   CREATININE 0.8 0.9 0.7   CALCIUM 7.5* 7.9* 7.5*       Assessment:    Principal Problem:    Obstructive jaundice  Active Problems:    Acute pancreatitis    Acute cholecystitis    Painless jaundice    Tachypnea  Resolved Problems:    * No resolved hospital problems. *      Plan:  69-year-old female presents to the ED with complaints of abdominal pain that is been ongoing for a week and is admitted to telemetry unit with     Painless obstructive jaundice/acute pancreatitis/acute cholecystitis  Pain control  Monitor LFTs-total bili 5.2 fall  MRCP followed by ERCP for closer look at biliary ducts/obstruction-she is now agreeable to MRCP  IV hydration for peripancreatic stranding and inflammation/consistent with acute pancreatitis  IV zosyn 3375  Monitor WBC - 56.4  Hold  case was discussed in detail and plans for care were established. Review of LUCIE Gao-CNP   , documentation was conducted and revisions were made as appropriate directly by me. I agree with the above documented exam, problem list, and plan of care.     Eloise Suarez MD  3:21 PM  8/16/2024

## 2024-08-17 LAB
ALBUMIN SERPL-MCNC: 2.2 G/DL (ref 3.5–5.2)
ALP SERPL-CCNC: 238 U/L (ref 35–104)
ALT SERPL-CCNC: 31 U/L (ref 0–32)
ANION GAP SERPL CALCULATED.3IONS-SCNC: 15 MMOL/L (ref 7–16)
AST SERPL-CCNC: 33 U/L (ref 0–31)
BASOPHILS # BLD: 0.15 K/UL (ref 0–0.2)
BASOPHILS NFR BLD: 0 % (ref 0–2)
BILIRUB DIRECT SERPL-MCNC: 1.7 MG/DL (ref 0–0.3)
BILIRUB INDIRECT SERPL-MCNC: 0.8 MG/DL (ref 0–1)
BILIRUB SERPL-MCNC: 2.5 MG/DL (ref 0–1.2)
BILIRUB SERPL-MCNC: 2.5 MG/DL (ref 0–1.2)
BUN SERPL-MCNC: 17 MG/DL (ref 6–23)
CALCIUM SERPL-MCNC: 8.1 MG/DL (ref 8.6–10.2)
CHLORIDE SERPL-SCNC: 97 MMOL/L (ref 98–107)
CO2 SERPL-SCNC: 22 MMOL/L (ref 22–29)
CREAT SERPL-MCNC: 0.7 MG/DL (ref 0.5–1)
EOSINOPHIL # BLD: 0.07 K/UL (ref 0.05–0.5)
EOSINOPHILS RELATIVE PERCENT: 0 % (ref 0–6)
ERYTHROCYTE [DISTWIDTH] IN BLOOD BY AUTOMATED COUNT: 14.6 % (ref 11.5–15)
GFR, ESTIMATED: >90 ML/MIN/1.73M2
GLUCOSE SERPL-MCNC: 68 MG/DL (ref 74–99)
HCT VFR BLD AUTO: 36 % (ref 34–48)
HGB BLD-MCNC: 11.4 G/DL (ref 11.5–15.5)
IMM GRANULOCYTES # BLD AUTO: 1.2 K/UL (ref 0–0.58)
IMM GRANULOCYTES NFR BLD: 3 % (ref 0–5)
LDH SERPL-CCNC: 425 U/L (ref 135–214)
LIPASE SERPL-CCNC: 33 U/L (ref 13–60)
LIPASE SERPL-CCNC: 38 U/L (ref 13–60)
LYMPHOCYTES NFR BLD: 1.57 K/UL (ref 1.5–4)
LYMPHOCYTES RELATIVE PERCENT: 4 % (ref 20–42)
MCH RBC QN AUTO: 29.2 PG (ref 26–35)
MCHC RBC AUTO-ENTMCNC: 31.7 G/DL (ref 32–34.5)
MCV RBC AUTO: 92.1 FL (ref 80–99.9)
MONOCYTES NFR BLD: 1.63 K/UL (ref 0.1–0.95)
MONOCYTES NFR BLD: 4 % (ref 2–12)
NEUTROPHILS NFR BLD: 88 % (ref 43–80)
NEUTS SEG NFR BLD: 33.05 K/UL (ref 1.8–7.3)
PLATELET # BLD AUTO: 422 K/UL (ref 130–450)
PMV BLD AUTO: 9.8 FL (ref 7–12)
POTASSIUM SERPL-SCNC: 3.9 MMOL/L (ref 3.5–5)
PROT SERPL-MCNC: 6.5 G/DL (ref 6.4–8.3)
RBC # BLD AUTO: 3.91 M/UL (ref 3.5–5.5)
RBC # BLD: ABNORMAL 10*6/UL
SODIUM SERPL-SCNC: 134 MMOL/L (ref 132–146)
WBC OTHER # BLD: 37.7 K/UL (ref 4.5–11.5)

## 2024-08-17 PROCEDURE — 6370000000 HC RX 637 (ALT 250 FOR IP): Performed by: NURSE PRACTITIONER

## 2024-08-17 PROCEDURE — 82787 IGG 1 2 3 OR 4 EACH: CPT

## 2024-08-17 PROCEDURE — 86316 IMMUNOASSAY TUMOR OTHER: CPT

## 2024-08-17 PROCEDURE — 36415 COLL VENOUS BLD VENIPUNCTURE: CPT

## 2024-08-17 PROCEDURE — 85025 COMPLETE CBC W/AUTO DIFF WBC: CPT

## 2024-08-17 PROCEDURE — 80053 COMPREHEN METABOLIC PANEL: CPT

## 2024-08-17 PROCEDURE — 6370000000 HC RX 637 (ALT 250 FOR IP): Performed by: INTERNAL MEDICINE

## 2024-08-17 PROCEDURE — 2580000003 HC RX 258: Performed by: NURSE PRACTITIONER

## 2024-08-17 PROCEDURE — 82248 BILIRUBIN DIRECT: CPT

## 2024-08-17 PROCEDURE — 83615 LACTATE (LD) (LDH) ENZYME: CPT

## 2024-08-17 PROCEDURE — 6360000002 HC RX W HCPCS: Performed by: NURSE PRACTITIONER

## 2024-08-17 PROCEDURE — 2060000000 HC ICU INTERMEDIATE R&B

## 2024-08-17 PROCEDURE — 99222 1ST HOSP IP/OBS MODERATE 55: CPT | Performed by: TRANSPLANT SURGERY

## 2024-08-17 PROCEDURE — 99233 SBSQ HOSP IP/OBS HIGH 50: CPT | Performed by: INTERNAL MEDICINE

## 2024-08-17 PROCEDURE — 83690 ASSAY OF LIPASE: CPT

## 2024-08-17 PROCEDURE — 82105 ALPHA-FETOPROTEIN SERUM: CPT

## 2024-08-17 PROCEDURE — 86301 IMMUNOASSAY TUMOR CA 19-9: CPT

## 2024-08-17 RX ADMIN — PIPERACILLIN AND TAZOBACTAM 3375 MG: 3; .375 INJECTION, POWDER, LYOPHILIZED, FOR SOLUTION INTRAVENOUS at 13:13

## 2024-08-17 RX ADMIN — PIPERACILLIN AND TAZOBACTAM 3375 MG: 3; .375 INJECTION, POWDER, LYOPHILIZED, FOR SOLUTION INTRAVENOUS at 20:13

## 2024-08-17 RX ADMIN — PANTOPRAZOLE SODIUM 40 MG: 40 INJECTION, POWDER, FOR SOLUTION INTRAVENOUS at 07:51

## 2024-08-17 RX ADMIN — LEVOTHYROXINE SODIUM 75 MCG: 75 TABLET ORAL at 05:30

## 2024-08-17 RX ADMIN — PIPERACILLIN AND TAZOBACTAM 3375 MG: 3; .375 INJECTION, POWDER, LYOPHILIZED, FOR SOLUTION INTRAVENOUS at 04:51

## 2024-08-17 RX ADMIN — SODIUM CHLORIDE, PRESERVATIVE FREE 10 ML: 5 INJECTION INTRAVENOUS at 07:52

## 2024-08-17 RX ADMIN — SODIUM CHLORIDE: 9 INJECTION, SOLUTION INTRAVENOUS at 04:50

## 2024-08-17 RX ADMIN — METOPROLOL SUCCINATE 50 MG: 50 TABLET, EXTENDED RELEASE ORAL at 07:51

## 2024-08-17 ASSESSMENT — ENCOUNTER SYMPTOMS
BACK PAIN: 0
EYE DISCHARGE: 0
VOMITING: 0
CONSTIPATION: 0
ABDOMINAL PAIN: 1
EYE PAIN: 0
NAUSEA: 0
DIARRHEA: 0
BLOOD IN STOOL: 0
SHORTNESS OF BREATH: 0
PHOTOPHOBIA: 0

## 2024-08-17 NOTE — PROGRESS NOTES
INPATIENT CARDIOLOGY FOLLOW-UP    Name: Minerva Booth Start    Age: 69 y.o.    Date of Admission: 8/14/2024  1:02 PM    Date of Service: 8/17/2024    Primary Cardiologist: None, known to me from this admission    Chief Complaint: Follow-up for elevated troponin    Interim History:  Converted to sinus rhythm yesterday morning at 936.  Remains in sinus.  Denies chest pain shortness of breath palpitations abdominal pain.    Review of Systems:   Negative except as described above    Problem List:  Patient Active Problem List   Diagnosis    Obstructive jaundice    Acute pancreatitis    Acute cholecystitis    Painless jaundice    Tachypnea       Current Medications:    Current Facility-Administered Medications:     pantoprazole (PROTONIX) 40 mg in sodium chloride (PF) 0.9 % 10 mL injection, 40 mg, IntraVENous, Daily, Waleska Jean Baptiste, APRN - CNP, 40 mg at 08/17/24 0751    metoprolol succinate (TOPROL XL) extended release tablet 50 mg, 50 mg, Oral, Sawyer GILLIS Rene Hugo, MD, 50 mg at 08/17/24 0751    LORazepam (ATIVAN) injection 1 mg, 1 mg, IntraVENous, Q6H PRN, Eloise Suarez MD, 1 mg at 08/15/24 1528    ondansetron (ZOFRAN) injection 4 mg, 4 mg, IntraVENous, Once, Marquita Yoder MD    sodium chloride flush 0.9 % injection 5-40 mL, 5-40 mL, IntraVENous, 2 times per day, Shanae-Ankit, April, APRN - CNP, 10 mL at 08/17/24 0752    sodium chloride flush 0.9 % injection 5-40 mL, 5-40 mL, IntraVENous, PRN, Sandoval-Ankit, April, APRN - CNP    0.9 % sodium chloride infusion, , IntraVENous, PRN, Sandoval-Ankit, April, APRN - CNP, Last Rate: 25 mL/hr at 08/15/24 0404, New Bag at 08/15/24 0404    potassium chloride (KLOR-CON M) extended release tablet 40 mEq, 40 mEq, Oral, PRN, 40 mEq at 08/16/24 1124 **OR** potassium bicarb-citric acid (EFFER-K) effervescent tablet 40 mEq, 40 mEq, Oral, PRN **OR** potassium chloride 10 mEq/100 mL IVPB (Peripheral Line), 10 mEq, IntraVENous, PRN, Kellen, April, APRN - CNP, Last      No components found for: \"LDLCALC\", \"LDLCHOLESTEROL\"  Lab Results   Component Value Date    VLDL 13 2016    VLDL 17 10/27/2015     No results found for: \"CHOLHDLRATIO\"  Recent Labs     24  0335   PROBNP 4,128*       Cardiac Tests:    EK/15/2024: Sinus rhythm PACs, high lateral ST depression/T wave inversions     2024: A-fib RVR    Telemetry: Sinus-> AF RVR 120s-160s    Chest X-ray:     Impression:        No acute process.    Mild cardiomegaly.     MRCP  Impression:        1. Acute interstitial edematous pancreatitis. No acute necrotic collection  with secondary inflammation of the adjacent duodenal 2nd portion.  No  evidence for pancreas divisum anatomy.  2. Acute cholecystitis.  3. Cholelithiasis.  No evidence for choledocholithiasis.  4. Trace to small ascites.  5. Trace bilateral pleural effusions right greater than left.     Echocardiogram:   TTE 24    Left Ventricle: Hyperdynamic left ventricular systolic function with a visually estimated EF of 70 - 75%. Left ventricle size is normal. Mildly increased wall thickness. Normal wall motion. Indeterminate diastolic function.    Right Ventricle: Normal systolic function. TAPSE is 2.2 cm.    Aorta: Normal sized aortic root. Mildly dilated ascending aorta. Ao ascending diameter is 3.9 cm.    Pericardium: Trivial pericardial effusion present. No indication of cardiac tamponade.    Image quality is suboptimal. Contrast used: Definity.    Stress test:      Cardiac catheterization:     ----------------------------------------------------------------------------------------------------------------------------------------------------------------  IMPRESSION:  New onset atrial fibrillation RVR.  [id number] at least 3.  Converted to sinus yesterday  Elevated hs-cTnT: [phone] ng/L ? Type II MI  Elevated proBNP 4100  PACs/PVCs  Acute pancreatitis/cholecystitis/cholangitis  Obstructive jaundice, abnormal LFTs,  hyperbilirubinemia  Encephalopathy improved  Hyponatremia improved  Hypokalemia improved  Marked leukocytosis  Obesity BMI 33 kg/m²    RECOMMENDATIONS:  Back in sinus.  Certainly remains at risk of recurrent atrial fibrillation.  Arrhythmia likely precipitated by acute abdominal processes.    Continue metoprolol succinate, increased to 50 mg daily to promote maintenance of sinus rhythm  Recommend anticoagulation for stroke risk reduction if not contraindicated; will hold for right now given acute inflammatory processes with potential for hemorrhagic complications  Patient does not want to take anticoagulation, risk and benefits discussed  Maintain K > 4.0, mag > 2.0  Caution with IV fluids to avoid precipitating decompensated heart failure  Likely at elevated risk for any surgery/procedures  Eventual ischemic evaluation outpatient  Looks like she will need elective cholecystectomy  Consider outpatient sleep study  Aggressive risk factor modification  Further care per primary service and consultants    Hai Jacques MD, [facility]    NOTE: This report was transcribed using voice recognition software. Every effort was made to ensure accuracy; however, inadvertent computerized transcription errors may be present.

## 2024-08-17 NOTE — CONSULTS
Hepatobiliary and Pancreatic Surgery Attending History and Physical    Patient's Name/Date of Birth: Minerva Booth Start /1955 (69 y.o.)    Date: August 17, 2024     CC: Abdominal pain    HPI:  Patient is a very pleasant 69-year-old female with a history of biliary sludge who presented to the hospital with acute cholecystitis.  At that time she was also noted to have acute interstitial pancreatitis.  Currently she states that her abdominal pain is somewhat better.  She also is noted to have obstructive jaundice with a severe leukocytosis.  Currently she is tolerating a diet.  She also has been complaining of fatigue which she states is better.  She is currently on antibiotics    History reviewed. No pertinent past medical history.    History reviewed. No pertinent surgical history.    Current Facility-Administered Medications   Medication Dose Route Frequency Provider Last Rate Last Admin    pantoprazole (PROTONIX) 40 mg in sodium chloride (PF) 0.9 % 10 mL injection  40 mg IntraVENous Daily SugarWaleska, APRN - CNP   40 mg at 08/17/24 0751    metoprolol succinate (TOPROL XL) extended release tablet 50 mg  50 mg Oral German Rutherford MD   50 mg at 08/17/24 0751    LORazepam (ATIVAN) injection 1 mg  1 mg IntraVENous Q6H PRN Eloise Suarez MD   1 mg at 08/15/24 1528    ondansetron (ZOFRAN) injection 4 mg  4 mg IntraVENous Once Marquita Yoder MD        sodium chloride flush 0.9 % injection 5-40 mL  5-40 mL IntraVENous 2 times per day Kellen, April, APRN - CNP   10 mL at 08/17/24 0752    sodium chloride flush 0.9 % injection 5-40 mL  5-40 mL IntraVENous PRN Grant-Cayuga, April, APRN - CNP        0.9 % sodium chloride infusion   IntraVENous PRN Shanae-Ankit, April, APRN - CNP 25 mL/hr at 08/15/24 0404 New Bag at 08/15/24 0404    potassium chloride (KLOR-CON M) extended release tablet 40 mEq  40 mEq Oral PRN Shanae-Ankit, April, APRN - CNP   40 mEq at 08/16/24 1124    Or    potassium  Not on file   Other Topics Concern    Not on file   Social History Narrative    Not on file     Social Determinants of Health     Financial Resource Strain: Not on file   Food Insecurity: No Food Insecurity (8/14/2024)    Hunger Vital Sign     Worried About Running Out of Food in the Last Year: Never true     Ran Out of Food in the Last Year: Never true   Transportation Needs: No Transportation Needs (8/14/2024)    PRAPARE - Transportation     Lack of Transportation (Medical): No     Lack of Transportation (Non-Medical): No   Physical Activity: Not on file   Stress: Not on file   Social Connections: Not on file   Intimate Partner Violence: Not on file   Housing Stability: Low Risk  (8/14/2024)    Housing Stability Vital Sign     Unable to Pay for Housing in the Last Year: No     Number of Times Moved in the Last Year: 1     Homeless in the Last Year: No       ROS:   Review of Systems   Constitutional:  Positive for activity change and appetite change. Negative for chills, diaphoresis and fever.   HENT:  Negative for congestion, ear discharge, ear pain, hearing loss, nosebleeds and tinnitus.    Eyes:  Negative for photophobia, pain and discharge.   Respiratory:  Negative for shortness of breath.    Cardiovascular:  Negative for palpitations and leg swelling.   Gastrointestinal:  Positive for abdominal pain. Negative for blood in stool, constipation, diarrhea, nausea and vomiting.   Endocrine: Negative for polydipsia.   Genitourinary:  Negative for frequency, hematuria and urgency.   Musculoskeletal:  Negative for back pain and neck pain.   Skin:  Negative for rash.   Allergic/Immunologic: Negative for environmental allergies.   Neurological:  Negative for tremors and seizures.   Psychiatric/Behavioral:  Negative for hallucinations and suicidal ideas. The patient is not nervous/anxious.        Physical Exam:  BP (!) 172/72   Pulse 83   Temp 97.6 °F (36.4 °C) (Oral)   Resp 16   Ht 1.626 m (5' 4\")   Wt 91.6 kg (201  lb 15.1 oz)   SpO2 92%   BMI 32.59 kg/m²       PSYCH: mood and affect normal, alert and oriented x 3: No apparent distress, comfortable  EYES: Sclera white, pupils equal round and reactive to light  ENMT:  Hearing normal, trachea midline, ears externally intact  LYMPH: no obvious lympadenopathy in neck.   RESP: Respiratory effort was normal with no retractions or use of accessory muscles.  CV:  No pedal edema  GI/ Abdomen: Soft, nondistended, nontender, no guarding, no peritoneal signs  MSK: no clubbing/ no cyanosis/ gaitnormal    Assessment & Plan   Obstructive jaundice due to acute cholangitis, acute cholecystitis, gallstone pancreatitis  -I reviewed her images prior to our visit  -Her CT scan and MRI are consistent with a cholangitis picture along with acute pancreatitis  -Her leukocytosis is trending down with antibiotics.  Her leukocytosis is also likely secondary to her acute pancreatitis, it is a combination of cholangitis and pancreatitis  -Continue diet  -Will need an elective cholecystectomy    Thank you for the consultation allowing me to take part in Ms. Leobardo Bell's care.     LEAH Bhagat M.D.  8/17/2024  9:16 AM

## 2024-08-17 NOTE — PROGRESS NOTES
PROGRESS NOTE    Patient Presents with/Seen in Consultation For      Reason for Consult: obstructive jaundice bili 5.7.  Cholecystitis and pancreatitis      CHIEF COMPLAINT: Fatigue    Subjective:     Patient seen laying in bed.  No complaints at this time.  States she feels well.  Tolerating clears, requesting food.  Denies any nausea vomiting or pain.  States she is not having any procedures completed.  Plan of care reviewed all questions answered.     Review of Systems  Aside from what was mentioned in the PMH and HPI, essentially unremarkable, all others negative.    Objective:     Patient Vitals for the past 8 hrs:   BP Temp Temp src Pulse Resp SpO2   08/17/24 1521 (!) 185/77 98.7 °F (37.1 °C) Oral 81 16 94 %       General appearance: alert, awake, laying in bed, and cooperative, ill-appearing, obese, slightly tachypneic  Eyes: conjunctivae/corneas clear. PERRL.  Lungs: clear to auscultation bilaterally  Heart: regular rate and rhythm, no murmur, 2+ pulses; trace edema  Abdomen: soft, non-tender; bowel sounds normal; no masses  Extremities: Trace edema  Pulses: 2+ and symmetric  Skin: Skin color, texture, turgor normal.   Neurologic: Grossly normal    pantoprazole (PROTONIX) 40 mg in sodium chloride (PF) 0.9 % 10 mL injection, Daily  metoprolol succinate (TOPROL XL) extended release tablet 50 mg, QAM  LORazepam (ATIVAN) injection 1 mg, Q6H PRN  ondansetron (ZOFRAN) injection 4 mg, Once  sodium chloride flush 0.9 % injection 5-40 mL, 2 times per day  sodium chloride flush 0.9 % injection 5-40 mL, PRN  0.9 % sodium chloride infusion, PRN  potassium chloride (KLOR-CON M) extended release tablet 40 mEq, PRN   Or  potassium bicarb-citric acid (EFFER-K) effervescent tablet 40 mEq, PRN   Or  potassium chloride 10 mEq/100 mL IVPB (Peripheral Line), PRN  magnesium sulfate 2000 mg in 50 mL IVPB premix, PRN  ondansetron (ZOFRAN-ODT) disintegrating tablet 4 mg, Q8H PRN   Or  ondansetron (ZOFRAN) injection 4 mg, Q6H  18.1 08/14/2024 01:45 PM    INR 1.7 08/14/2024 01:45 PM       Assessment:     Principal Problem:  Hyperbilirubinemia  Elevated LFTs  Leukocytosis-defer  Pancreatitis  Cholecystitis- per CT   Electrolyte abnormality-defer  Elevated lipase  Lactic acidosis-resolved  Afib- RVR    Plan:     Cardiology following   Protonix 40 mg daily  Surgery, oncology following  CBC today and daily  Continue IV antibiotics; as ordered  Medical management per primary care team  Low-fat diet as tolerated  Supportive care  Trend labs  Will sign off; call again if needed. Patient refusing any procedures at this time    Discussed with Dr. Stevens  Plan per Dr. Hilda FAULKNER, NP-C 8/18/2024 7:30  AM For Dr. Stevens    GI attending addendum:    The patient case was reviewed and discussed with the GI midlevel team.       Nehemias Stevens DO

## 2024-08-17 NOTE — PROGRESS NOTES
PROGRESS NOTE    Patient Presents with/Seen in Consultation For      Reason for Consult: obstructive jaundice bili 5.7.  Cholecystitis and pancreatitis      CHIEF COMPLAINT: Fatigue    Subjective:     Patient seen laying in bed.  States she is feeling well.  Tolerating clears. Denies any N/V, or abdominal pain.  Passing gas.  No family present at this time.  MRI/MRCP results reviewed with patient    Review of Systems  Aside from what was mentioned in the PMH and HPI, essentially unremarkable, all others negative.    Objective:     Patient Vitals for the past 8 hrs:   BP Temp Temp src Pulse Resp SpO2   08/17/24 1103 (!) 174/74 98.3 °F (36.8 °C) Oral 77 16 95 %   08/17/24 0745 (!) 172/72 97.6 °F (36.4 °C) Oral 83 16 92 %       General appearance: alert, awake, laying in bed, and cooperative, ill-appearing, obese, slightly tachypneic  Eyes: conjunctivae/corneas clear. PERRL.  Lungs: clear to auscultation bilaterally  Heart: regular rate and rhythm, no murmur, 2+ pulses; trace edema  Abdomen: soft, non-tender; bowel sounds normal; no masses  Extremities: Trace edema  Pulses: 2+ and symmetric  Skin: Skin color, texture, turgor normal.   Neurologic: Grossly normal    pantoprazole (PROTONIX) 40 mg in sodium chloride (PF) 0.9 % 10 mL injection, Daily  metoprolol succinate (TOPROL XL) extended release tablet 50 mg, QAM  LORazepam (ATIVAN) injection 1 mg, Q6H PRN  ondansetron (ZOFRAN) injection 4 mg, Once  sodium chloride flush 0.9 % injection 5-40 mL, 2 times per day  sodium chloride flush 0.9 % injection 5-40 mL, PRN  0.9 % sodium chloride infusion, PRN  potassium chloride (KLOR-CON M) extended release tablet 40 mEq, PRN   Or  potassium bicarb-citric acid (EFFER-K) effervescent tablet 40 mEq, PRN   Or  potassium chloride 10 mEq/100 mL IVPB (Peripheral Line), PRN  magnesium sulfate 2000 mg in 50 mL IVPB premix, PRN  ondansetron (ZOFRAN-ODT) disintegrating tablet 4 mg, Q8H PRN   Or  ondansetron (ZOFRAN) injection 4 mg, Q6H

## 2024-08-17 NOTE — PROGRESS NOTES
[facility]                                Progress note    Subjective:    Resting comfortably in bed  Denies any pain on assessment  She is alert and oriented  Undergoing echo    Objective:    BP (!) 174/74   Pulse 77   Temp 98.3 °F (36.8 °C) (Oral)   Resp 16   Ht 1.626 m (5' 4\")   Wt 91.6 kg (201 lb 15.1 oz)   SpO2 95%   BMI 32.59 kg/m²     In: -   Out: 400   In: -   Out: 400 [Urine:400]    General appearance: NAD, conversant, some lethargy however able to carry on conversation  HEENT: AT/NC, MMM  Neck: FROM, supple  Lungs: Clear to auscultation  CV: Irregular, tachycardia,   Vasc: Radial pulses 2+  Abdomen, non-tender; no masses or HSM, mildly distended  Extremities: No peripheral edema or digital cyanosis  Skin: no rash, lesions or ulcers  Psych: Alert and oriented to person, place and time  Neuro: Alert and interactive     Recent Labs     08/15/24  1915 08/16/24  0335 08/17/24  0800   WBC 63.0* 55.8* 37.7*   HGB 12.4 11.7 11.4*   HCT 36.8 35.8 36.0    365 422       Recent Labs     08/15/24  0527 08/16/24  0335 08/17/24  0510   * 129* 134   K 3.7 3.4* 3.9   CL 89* 92* 97*   CO2 26 25 22   BUN 10 13 17   CREATININE 0.9 0.7 0.7   CALCIUM 7.9* 7.5* 8.1*       Assessment:    Principal Problem:    Obstructive jaundice  Active Problems:    Acute pancreatitis    Acute cholecystitis    Painless jaundice    Tachypnea  Resolved Problems:    * No resolved hospital problems. *      Plan:  69-year-old female presents to the ED with complaints of abdominal pain that is been ongoing for a week and is admitted to telemetry unit with     Painless obstructive jaundice/acute pancreatitis/acute cholecystitis  Pain control  Monitor LFTs-total bili 5.2 fall  MRCP followed by ERCP for closer look at biliary ducts/obstruction-she is now agreeable to MRCP  IV hydration for peripancreatic stranding and inflammation/consistent with acute pancreatitis  IV zosyn 3375  Monitor WBC - 56.4  Hold

## 2024-08-18 LAB
ANION GAP SERPL CALCULATED.3IONS-SCNC: 10 MMOL/L (ref 7–16)
BASOPHILS # BLD: 0.06 K/UL (ref 0–0.2)
BASOPHILS NFR BLD: 0 % (ref 0–2)
BUN SERPL-MCNC: 10 MG/DL (ref 6–23)
CALCIUM SERPL-MCNC: 7.6 MG/DL (ref 8.6–10.2)
CHLORIDE SERPL-SCNC: 99 MMOL/L (ref 98–107)
CO2 SERPL-SCNC: 24 MMOL/L (ref 22–29)
CREAT SERPL-MCNC: 0.7 MG/DL (ref 0.5–1)
EOSINOPHIL # BLD: 0.06 K/UL (ref 0.05–0.5)
EOSINOPHILS RELATIVE PERCENT: 0 % (ref 0–6)
ERYTHROCYTE [DISTWIDTH] IN BLOOD BY AUTOMATED COUNT: 14.6 % (ref 11.5–15)
GFR, ESTIMATED: >90 ML/MIN/1.73M2
GLUCOSE SERPL-MCNC: 180 MG/DL (ref 74–99)
HCT VFR BLD AUTO: 34.7 % (ref 34–48)
HGB BLD-MCNC: 10.9 G/DL (ref 11.5–15.5)
IMM GRANULOCYTES # BLD AUTO: 0.81 K/UL (ref 0–0.58)
IMM GRANULOCYTES NFR BLD: 4 % (ref 0–5)
LIPASE SERPL-CCNC: 23 U/L (ref 13–60)
LYMPHOCYTES NFR BLD: 1.29 K/UL (ref 1.5–4)
LYMPHOCYTES RELATIVE PERCENT: 7 % (ref 20–42)
MCH RBC QN AUTO: 29 PG (ref 26–35)
MCHC RBC AUTO-ENTMCNC: 31.4 G/DL (ref 32–34.5)
MCV RBC AUTO: 92.3 FL (ref 80–99.9)
MICROORGANISM SPEC CULT: NORMAL
MICROORGANISM SPEC CULT: NORMAL
MONOCYTES NFR BLD: 0.91 K/UL (ref 0.1–0.95)
MONOCYTES NFR BLD: 5 % (ref 2–12)
NEUTROPHILS NFR BLD: 84 % (ref 43–80)
NEUTS SEG NFR BLD: 16.36 K/UL (ref 1.8–7.3)
PLATELET # BLD AUTO: 391 K/UL (ref 130–450)
PMV BLD AUTO: 9.5 FL (ref 7–12)
POTASSIUM SERPL-SCNC: 3.8 MMOL/L (ref 3.5–5)
RBC # BLD AUTO: 3.76 M/UL (ref 3.5–5.5)
SEND OUT REPORT: NORMAL
SEND OUT REPORT: NORMAL
SERVICE CMNT-IMP: NORMAL
SERVICE CMNT-IMP: NORMAL
SODIUM SERPL-SCNC: 133 MMOL/L (ref 132–146)
SPECIMEN DESCRIPTION: NORMAL
SPECIMEN DESCRIPTION: NORMAL
TEST NAME: NORMAL
TEST NAME: NORMAL
WBC OTHER # BLD: 19.5 K/UL (ref 4.5–11.5)

## 2024-08-18 PROCEDURE — 85025 COMPLETE CBC W/AUTO DIFF WBC: CPT

## 2024-08-18 PROCEDURE — 6360000002 HC RX W HCPCS: Performed by: NURSE PRACTITIONER

## 2024-08-18 PROCEDURE — 6370000000 HC RX 637 (ALT 250 FOR IP): Performed by: NURSE PRACTITIONER

## 2024-08-18 PROCEDURE — 83690 ASSAY OF LIPASE: CPT

## 2024-08-18 PROCEDURE — 2580000003 HC RX 258: Performed by: INTERNAL MEDICINE

## 2024-08-18 PROCEDURE — 80048 BASIC METABOLIC PNL TOTAL CA: CPT

## 2024-08-18 PROCEDURE — 99232 SBSQ HOSP IP/OBS MODERATE 35: CPT | Performed by: INTERNAL MEDICINE

## 2024-08-18 PROCEDURE — 2060000000 HC ICU INTERMEDIATE R&B

## 2024-08-18 PROCEDURE — 6370000000 HC RX 637 (ALT 250 FOR IP): Performed by: INTERNAL MEDICINE

## 2024-08-18 PROCEDURE — 99232 SBSQ HOSP IP/OBS MODERATE 35: CPT | Performed by: TRANSPLANT SURGERY

## 2024-08-18 PROCEDURE — 2580000003 HC RX 258: Performed by: NURSE PRACTITIONER

## 2024-08-18 PROCEDURE — 36415 COLL VENOUS BLD VENIPUNCTURE: CPT

## 2024-08-18 RX ADMIN — PIPERACILLIN AND TAZOBACTAM 3375 MG: 3; .375 INJECTION, POWDER, LYOPHILIZED, FOR SOLUTION INTRAVENOUS at 19:53

## 2024-08-18 RX ADMIN — PIPERACILLIN AND TAZOBACTAM 3375 MG: 3; .375 INJECTION, POWDER, LYOPHILIZED, FOR SOLUTION INTRAVENOUS at 13:35

## 2024-08-18 RX ADMIN — PANTOPRAZOLE SODIUM 40 MG: 40 INJECTION, POWDER, FOR SOLUTION INTRAVENOUS at 09:05

## 2024-08-18 RX ADMIN — LEVOTHYROXINE SODIUM 75 MCG: 75 TABLET ORAL at 05:15

## 2024-08-18 RX ADMIN — SODIUM CHLORIDE: 9 INJECTION, SOLUTION INTRAVENOUS at 09:11

## 2024-08-18 RX ADMIN — PIPERACILLIN AND TAZOBACTAM 3375 MG: 3; .375 INJECTION, POWDER, LYOPHILIZED, FOR SOLUTION INTRAVENOUS at 05:15

## 2024-08-18 RX ADMIN — SODIUM CHLORIDE, PRESERVATIVE FREE 10 ML: 5 INJECTION INTRAVENOUS at 09:06

## 2024-08-18 RX ADMIN — SODIUM CHLORIDE, PRESERVATIVE FREE 10 ML: 5 INJECTION INTRAVENOUS at 19:53

## 2024-08-18 RX ADMIN — METOPROLOL SUCCINATE 50 MG: 50 TABLET, EXTENDED RELEASE ORAL at 09:05

## 2024-08-18 NOTE — PROGRESS NOTES
INPATIENT CARDIOLOGY FOLLOW-UP    Name: Minerva Booth Start    Age: 69 y.o.    Date of Admission: 8/14/2024  1:02 PM    Date of Service: 8/18/2024    Primary Cardiologist: None, known to me from this admission    Chief Complaint: Follow-up for elevated troponin    Interim History:  Remains in sinus.  Denies chest pain shortness of breath palpitations abdominal pain.    Review of Systems:   Negative except as described above    Problem List:  Patient Active Problem List   Diagnosis    Obstructive jaundice    Acute pancreatitis    Acute cholecystitis    Painless jaundice    Tachypnea       Current Medications:    Current Facility-Administered Medications:     pantoprazole (PROTONIX) 40 mg in sodium chloride (PF) 0.9 % 10 mL injection, 40 mg, IntraVENous, Daily, Waleska Jean Baptiste, APRN - CNP, 40 mg at 08/18/24 0905    metoprolol succinate (TOPROL XL) extended release tablet 50 mg, 50 mg, Oral, Sawyer GILLIS Rene Hugo, MD, 50 mg at 08/18/24 0905    LORazepam (ATIVAN) injection 1 mg, 1 mg, IntraVENous, Q6H PRN, lEoise Suarez MD, 1 mg at 08/15/24 1528    ondansetron (ZOFRAN) injection 4 mg, 4 mg, IntraVENous, Once, Marquita Yoder MD    sodium chloride flush 0.9 % injection 5-40 mL, 5-40 mL, IntraVENous, 2 times per day, Kellen, April, APRN - CNP, 10 mL at 08/18/24 0906    sodium chloride flush 0.9 % injection 5-40 mL, 5-40 mL, IntraVENous, PRN, Shanae-Crystal Spring, April, APRN - CNP    0.9 % sodium chloride infusion, , IntraVENous, PRN, Shanae-Ankit, April, APRN - CNP, Last Rate: 25 mL/hr at 08/15/24 0404, New Bag at 08/15/24 0404    potassium chloride (KLOR-CON M) extended release tablet 40 mEq, 40 mEq, Oral, PRN, 40 mEq at 08/16/24 1124 **OR** potassium bicarb-citric acid (EFFER-K) effervescent tablet 40 mEq, 40 mEq, Oral, PRN **OR** potassium chloride 10 mEq/100 mL IVPB (Peripheral Line), 10 mEq, IntraVENous, PRN, Shanae-Ankit, April, APRN - CNP, Last Rate: 100 mL/hr at 08/14/24 2329, 10 mEq at 08/14/24  metoprolol succinate, increased to 50 mg daily to promote maintenance of sinus rhythm  Recommend eventual anticoagulation for stroke risk reduction; will hold for right now given acute inflammatory processes with potential for hemorrhagic complications  Patient does not want to take any anticoagulation regardless, risk and benefits discussed  Maintain K > 4.0, mag > 2.0  Caution with IV fluids to avoid precipitating decompensated heart failure  Likely at elevated risk for any surgery/procedures  Recommend outpatient ischemic evaluation, stress test once recovered from acute issues  Looks like she will need elective cholecystectomy eventually  Refusing any inpatient procedures including ERCP which was recommended by GI  Consider outpatient sleep study  Blood pressure management per primary service  Including weight loss recommended aggressive risk factor modification  Further care per primary service and consultants  Okay for discharge from cardiology standpoint when okay with others  Outpatient follow-up with me  Will be available as needed, please call with questions or if recurrent atrial arrhythmias develop    Hai Jacques MD, [facility] Cardiology    NOTE: This report was transcribed using voice recognition software. Every effort was made to ensure accuracy; however, inadvertent computerized transcription errors may be present.

## 2024-08-18 NOTE — PROGRESS NOTES
[facility]                                Progress note    Subjective:    Resting comfortably in bed  Feels well-markedly improved  Denies any acute complaints  Objective:    BP (!) 160/67   Pulse 76   Temp 98.5 °F (36.9 °C) (Oral)   Resp 18   Ht 1.626 m (5' 4\")   Wt 95.9 kg (211 lb 8 oz)   SpO2 97%   BMI 34.14 kg/m²     No intake/output data recorded.  No intake/output data recorded.    General appearance: NAD, conversant, some lethargy however able to carry on conversation  HEENT: AT/NC, MMM  Neck: FROM, supple  Lungs: Clear to auscultation  CV: Irregular, tachycardia,   Vasc: Radial pulses 2+  Abdomen, non-tender; no masses or HSM, mildly distended, positive bowel sounds, nontender to palpation  Extremities: No peripheral edema or digital cyanosis  Skin: no rash, lesions or ulcers  Psych: Alert and oriented to person, place and time  Neuro: Alert and interactive     Recent Labs     08/16/24  0335 08/17/24  0800 08/18/24  1020   WBC 55.8* 37.7* 19.5*   HGB 11.7 11.4* 10.9*   HCT 35.8 36.0 34.7    422 391       Recent Labs     08/16/24  0335 08/17/24  0510 08/18/24  1020   * 134 133   K 3.4* 3.9 3.8   CL 92* 97* 99   CO2 25 22 24   BUN 13 17 10   CREATININE 0.7 0.7 0.7   CALCIUM 7.5* 8.1* 7.6*       Assessment:    Principal Problem:    Obstructive jaundice  Active Problems:    Acute pancreatitis    Acute cholecystitis    Painless jaundice    Tachypnea  Resolved Problems:    * No resolved hospital problems. *      Plan:  69-year-old female presents to the ED with complaints of abdominal pain that is been ongoing for a week and is admitted to telemetry unit with     Painless obstructive jaundice/acute pancreatitis/acute cholecystitis  Pain control  Monitor LFTs-total bili 5.2 fall  MRCP followed by ERCP for closer look at biliary ducts/obstruction-she is now agreeable to MRCP  IV hydration for peripancreatic stranding and inflammation/consistent with acute pancreatitis  IV  zosyn 3375  Monitor WBC - 56.4  Hold anticoagulation  Antiemitics  Monitor Lipase - 371>193  Strict NPO  General Surgery/GI following  MRI completed for acute confusion and delirium-unremarkable for metastatic disease     Leukocytosis of unknown significance  Has gone up to 56,000 today from 47 on arrival  Check peripheral smear  Hematology evaluation  Rule out blood disorder/dyscrasia       8/16/24  -While attempting echo patient went into A-fib with RVR with heart rates in the 200s which appears to be new  -IV Lopressor ordered as well as p.o. Toprol-XL per cardiology  -K+ 3.4, replace  -BNP 4,128  -Improvement in LFTs  -MRCP acute interstitial pancreatitis with acute cholecystitis, small ascites and trace bilateral pleural effusions  -RRT was called later on in the morning due to AMS tachypnea and lethargy  -Echocardiogram completed awaiting read  -Awaiting input from surgery  -Continue NPO, IVF  -Await heme-onc input regarding leukocytosis  -Continue IV Zosynn    Patient seen and examined  RRT this morning secondary to A-fib RVR and lethargy  Appreciate cardiology input  Appreciate oncology input  MRCP completed and reviewed  White count remains markedly elevated at 56,000-not thought to be blood dyscrasia or blood malignancy per oncology-but workup is underway  As such we will obtain infectious disease consultation  She has remained on Zosyn since admission  Cultures are negative to date    8/17/2024  Looks and feels much better today  White count has trended down to 37,000 with ongoing Zosyn  Appreciate hepatobiliary input  Heart rate better controlled on Toprol XL 50 daily-cardiology on board, ejection fraction 70 to 75%    8/18/2024  Continues to improve daily  White count is now down to 19,000  Check procalcitonin and lactate tomorrow  Advance diet as tolerated  Blood cultures remain negative  Ambulate to check home-going status  Possible discharge next 48 hours if white count continues to improve    Eloise

## 2024-08-18 NOTE — PROGRESS NOTES
P Quality Flow/Interdisciplinary Rounds Progress Note        Quality Flow Rounds held on August 18, 2024    Disciplines Attending:  Bedside Nurse    Minerva Booth Arabella was admitted on 8/14/2024  1:02 PM    Anticipated Discharge Date:       Disposition:    Israel Score:  Israel Scale Score: 20    Readmission Risk              Risk of Unplanned Readmission:  11           Discussed patient goal for the day, patient clinical progression, and barriers to discharge.  The following Goal(s) of the Day/Commitment(s) have been identified:   Advance diet as tolerated, discharge planning       Toña Ledbetter RN  August 18, 2024

## 2024-08-18 NOTE — PROGRESS NOTES
Hepatobiliary and Pancreatic Surgery Progress Note    CC: Acute cholangitis    Subjective: Patient states that she would like to go home.  She denies any abdominal pain.  She still has a significant leukocytosis    OBJECTIVE      Physical    BP (!) 169/57   Pulse 77   Temp 97.8 °F (36.6 °C) (Oral)   Resp 15   Ht 1.626 m (5' 4\")   Wt 95.9 kg (211 lb 8 oz)   SpO2 97%   BMI 34.14 kg/m²       General appearance: appears in no acute distress  Lungs:respiratory effort normal without accessory numbers  Heart: no pedal edema  Abdomen: soft, nondistended, nontympanic, no guarding, no peritoneal signs, normoactive bowel sounds  Extremities: ROM normal    ASSESSMENT: Obstructive jaundice due to acute cholangitis, acute cholecystitis, gallstone pancreatitis     PLAN:    -Continue low-fat diet  -Continue Zosyn  -Await a.m. labs  Leukocytosis was downtrending    Thank you for the consultation and allowing me to take part in Ms. Leobardo Bell's care.      Nehemias Bhagat MD 8/18/2024 10:37 AM

## 2024-08-19 VITALS
HEART RATE: 80 BPM | SYSTOLIC BLOOD PRESSURE: 171 MMHG | HEIGHT: 64 IN | TEMPERATURE: 97.9 F | RESPIRATION RATE: 18 BRPM | BODY MASS INDEX: 36.54 KG/M2 | OXYGEN SATURATION: 95 % | DIASTOLIC BLOOD PRESSURE: 76 MMHG | WEIGHT: 214 LBS

## 2024-08-19 LAB
ALBUMIN SERPL-MCNC: 2.3 G/DL (ref 3.5–5.2)
ALP SERPL-CCNC: 161 U/L (ref 35–104)
ALT SERPL-CCNC: 20 U/L (ref 0–32)
ANION GAP SERPL CALCULATED.3IONS-SCNC: 7 MMOL/L (ref 7–16)
AST SERPL-CCNC: 28 U/L (ref 0–31)
BASOPHILS # BLD: 0 K/UL (ref 0–0.2)
BASOPHILS NFR BLD: 0 % (ref 0–2)
BILIRUB SERPL-MCNC: 1.8 MG/DL (ref 0–1.2)
BUN SERPL-MCNC: 7 MG/DL (ref 6–23)
CALCIUM SERPL-MCNC: 7.6 MG/DL (ref 8.6–10.2)
CHLORIDE SERPL-SCNC: 103 MMOL/L (ref 98–107)
CO2 SERPL-SCNC: 26 MMOL/L (ref 22–29)
CREAT SERPL-MCNC: 0.7 MG/DL (ref 0.5–1)
EOSINOPHIL # BLD: 0 K/UL (ref 0.05–0.5)
EOSINOPHILS RELATIVE PERCENT: 0 % (ref 0–6)
ERYTHROCYTE [DISTWIDTH] IN BLOOD BY AUTOMATED COUNT: 14.6 % (ref 11.5–15)
GFR, ESTIMATED: >90 ML/MIN/1.73M2
GLUCOSE SERPL-MCNC: 130 MG/DL (ref 74–99)
HCT VFR BLD AUTO: 34.8 % (ref 34–48)
HGB BLD-MCNC: 11.2 G/DL (ref 11.5–15.5)
LACTATE BLDV-SCNC: 0.9 MMOL/L (ref 0.5–2.2)
LIPASE SERPL-CCNC: 25 U/L (ref 13–60)
LYMPHOCYTES NFR BLD: 2.47 K/UL (ref 1.5–4)
LYMPHOCYTES RELATIVE PERCENT: 12 % (ref 20–42)
MCH RBC QN AUTO: 29.4 PG (ref 26–35)
MCHC RBC AUTO-ENTMCNC: 32.2 G/DL (ref 32–34.5)
MCV RBC AUTO: 91.3 FL (ref 80–99.9)
MICROORGANISM SPEC CULT: NORMAL
MICROORGANISM SPEC CULT: NORMAL
MONOCYTES NFR BLD: 1.24 K/UL (ref 0.1–0.95)
MONOCYTES NFR BLD: 6 % (ref 2–12)
NEUTROPHILS NFR BLD: 82 % (ref 43–80)
NEUTS SEG NFR BLD: 16.89 K/UL (ref 1.8–7.3)
PATH REV BLD -IMP: NORMAL
PLATELET # BLD AUTO: 373 K/UL (ref 130–450)
PMV BLD AUTO: 9.4 FL (ref 7–12)
POTASSIUM SERPL-SCNC: 3.5 MMOL/L (ref 3.5–5)
PROCALCITONIN SERPL-MCNC: 0.26 NG/ML (ref 0–0.08)
PROT SERPL-MCNC: 6.4 G/DL (ref 6.4–8.3)
RBC # BLD AUTO: 3.81 M/UL (ref 3.5–5.5)
RBC # BLD: ABNORMAL 10*6/UL
SERVICE CMNT-IMP: NORMAL
SERVICE CMNT-IMP: NORMAL
SODIUM SERPL-SCNC: 136 MMOL/L (ref 132–146)
SPECIMEN DESCRIPTION: NORMAL
SPECIMEN DESCRIPTION: NORMAL
WBC OTHER # BLD: 20.6 K/UL (ref 4.5–11.5)

## 2024-08-19 PROCEDURE — 2580000003 HC RX 258: Performed by: NURSE PRACTITIONER

## 2024-08-19 PROCEDURE — 36415 COLL VENOUS BLD VENIPUNCTURE: CPT

## 2024-08-19 PROCEDURE — 80053 COMPREHEN METABOLIC PANEL: CPT

## 2024-08-19 PROCEDURE — 6370000000 HC RX 637 (ALT 250 FOR IP): Performed by: NURSE PRACTITIONER

## 2024-08-19 PROCEDURE — 97535 SELF CARE MNGMENT TRAINING: CPT

## 2024-08-19 PROCEDURE — 84145 PROCALCITONIN (PCT): CPT

## 2024-08-19 PROCEDURE — 6370000000 HC RX 637 (ALT 250 FOR IP): Performed by: INTERNAL MEDICINE

## 2024-08-19 PROCEDURE — 2580000003 HC RX 258: Performed by: INTERNAL MEDICINE

## 2024-08-19 PROCEDURE — 6360000002 HC RX W HCPCS: Performed by: NURSE PRACTITIONER

## 2024-08-19 PROCEDURE — 99232 SBSQ HOSP IP/OBS MODERATE 35: CPT | Performed by: STUDENT IN AN ORGANIZED HEALTH CARE EDUCATION/TRAINING PROGRAM

## 2024-08-19 PROCEDURE — 2060000000 HC ICU INTERMEDIATE R&B

## 2024-08-19 PROCEDURE — 83605 ASSAY OF LACTIC ACID: CPT

## 2024-08-19 PROCEDURE — 97165 OT EVAL LOW COMPLEX 30 MIN: CPT

## 2024-08-19 PROCEDURE — 85025 COMPLETE CBC W/AUTO DIFF WBC: CPT

## 2024-08-19 PROCEDURE — 83690 ASSAY OF LIPASE: CPT

## 2024-08-19 RX ORDER — POTASSIUM CHLORIDE 1500 MG/1
20 TABLET, EXTENDED RELEASE ORAL ONCE
Status: COMPLETED | OUTPATIENT
Start: 2024-08-19 | End: 2024-08-19

## 2024-08-19 RX ORDER — METOPROLOL SUCCINATE 50 MG/1
50 TABLET, EXTENDED RELEASE ORAL 2 TIMES DAILY
Status: DISCONTINUED | OUTPATIENT
Start: 2024-08-19 | End: 2024-08-19

## 2024-08-19 RX ORDER — METOPROLOL SUCCINATE 50 MG/1
50 TABLET, EXTENDED RELEASE ORAL DAILY
Status: DISCONTINUED | OUTPATIENT
Start: 2024-08-20 | End: 2024-08-20 | Stop reason: HOSPADM

## 2024-08-19 RX ORDER — PANTOPRAZOLE SODIUM 40 MG/1
40 TABLET, DELAYED RELEASE ORAL
Status: DISCONTINUED | OUTPATIENT
Start: 2024-08-19 | End: 2024-08-20 | Stop reason: HOSPADM

## 2024-08-19 RX ADMIN — PANTOPRAZOLE SODIUM 40 MG: 40 TABLET, DELAYED RELEASE ORAL at 08:22

## 2024-08-19 RX ADMIN — LEVOTHYROXINE SODIUM 75 MCG: 75 TABLET ORAL at 05:13

## 2024-08-19 RX ADMIN — METOPROLOL SUCCINATE 50 MG: 50 TABLET, EXTENDED RELEASE ORAL at 08:22

## 2024-08-19 RX ADMIN — SODIUM CHLORIDE, PRESERVATIVE FREE 10 ML: 5 INJECTION INTRAVENOUS at 08:22

## 2024-08-19 RX ADMIN — PIPERACILLIN AND TAZOBACTAM 3375 MG: 3; .375 INJECTION, POWDER, LYOPHILIZED, FOR SOLUTION INTRAVENOUS at 21:07

## 2024-08-19 RX ADMIN — PIPERACILLIN AND TAZOBACTAM 3375 MG: 3; .375 INJECTION, POWDER, LYOPHILIZED, FOR SOLUTION INTRAVENOUS at 05:14

## 2024-08-19 RX ADMIN — PIPERACILLIN AND TAZOBACTAM 3375 MG: 3; .375 INJECTION, POWDER, LYOPHILIZED, FOR SOLUTION INTRAVENOUS at 12:50

## 2024-08-19 RX ADMIN — SODIUM CHLORIDE: 9 INJECTION, SOLUTION INTRAVENOUS at 05:13

## 2024-08-19 RX ADMIN — POTASSIUM CHLORIDE 20 MEQ: 1500 TABLET, EXTENDED RELEASE ORAL at 12:45

## 2024-08-19 NOTE — PROGRESS NOTES
[facility]                                Progress note    Subjective:  Denies chest pain and dyspnea.  Denies abdominal pain   States that she is leaving today no matter what    Objective:  Sitting on the edge of the bed  RN states that she is unsteady and refusing PT OT evaluation      BP (!) 149/66   Pulse 77   Temp 98.3 °F (36.8 °C) (Oral)   Resp 18   Ht 1.626 m (5' 4\")   Wt 97.1 kg (214 lb)   SpO2 95%   BMI 34.54 kg/m²   CONST:  Well developed, obese, elderly  female who appears stated age. Awake, alert, cooperative, no apparent distress  HEENT:   Head- Normocephalic, atraumatic   Eyes- Conjunctivae pink, anicteric  Throat- Oral mucosa pink and moist  Neck-  No stridor, trachea midline, no jugular venous distention. No adenopathy   CHEST: Chest symmetrical and non-tender to palpation. No accessory muscle use or intercostal retractions  RESPIRATORY: Lung sounds - clear throughout fields   CARDIOVASCULAR:     No carotid bruit  Heart Inspection- shows no noted pulsations  Heart Palpation- no heaves or thrills; PMI is non-displaced   Heart Ausculation- Regular rate and rhythm, no murmur. No s3, s4 or rub   PV: No lower extremity edema. No varicosities. Pedal pulses palpable, no clubbing or cyanosis   ABDOMEN: Soft, obese, non-tender to light palpation. Bowel sounds present. No palpable masses no organomegaly; no abdominal bruit  MS: Good muscle strength and tone. No atrophy or abnormal movements.   : Deferred  SKIN: Warm and dry no statis dermatitis or ulcers   NEURO / PSYCH: Oriented to person, place and time. Speech clear and appropriate. Follows all commands. Pleasant affect        Recent Labs     08/17/24  0800 08/18/24  1020   WBC 37.7* 19.5*   HGB 11.4* 10.9*   HCT 36.0 34.7    391       Recent Labs     08/17/24  0510 08/18/24  1020    133   K 3.9 3.8   CL 97* 99   CO2 22 24   BUN 17 10   CREATININE 0.7 0.7   CALCIUM 8.1* 7.6*

## 2024-08-19 NOTE — PROGRESS NOTES
Notification of IV TO PO conversion  This patient's order for PROTONIX IV hs been changed to PROTONIX oral as approved by the pharmacy & therapeutics and Medical Executive Committees

## 2024-08-19 NOTE — PROGRESS NOTES
OCCUPATIONAL THERAPY INITIAL EVALUATION    [facility]   [address] [address]        Date:2024                                                  Patient Name: Minerva Bell    MRN: [id number]    : 1955    Room: [address] [place] [address]    Evaluating OT: Dennise Sullivan OTR/L #[id number]    Referring Provider:  Eloise Suarez MD     Specific Provider Orders/Date:  OT Eval and Treat , 2024     Diagnosis:   1. Painless jaundice    2. Acute pancreatitis, unspecified complication status, unspecified pancreatitis type    3. Leukocytosis, unspecified type    4. Elevated LFTs    5. Gallstones    6. Acute coronary syndrome (HCC)         Surgery: None       Pertinent Medical History:    History reviewed. No pertinent past medical history.    History reviewed. No pertinent surgical history.    Precautions:  Fall Risk, alarm      Assessment of current deficits    [x] Functional mobility  [x]ADLs  [x] Strength               []Cognition    [x] Functional transfers   [x] IADLs         [x] Safety Awareness   [x]Endurance    [] Fine Coordination              [x] Balance      [] Vision/perception   []Sensation     []Gross Motor Coordination  [] ROM  [] Delirium                   [] Motor Control     OT PLAN OF CARE   OT POC based on physician orders, patient diagnosis and results of clinical assessment    Frequency/Duration 2-5 days/wk for 2-4 weeks PRN     Specific OT Treatment Interventions to include:   * Instruction/training on adapted ADL techniques and AE recommendations to increase functional independence within precautions       * Training on energy conservation strategies, correct breathing pattern and techniques to improve independence/tolerance for self-care routine  * Functional transfer/mobility training/DME recommendations for increased independence, safety, and fall prevention  * Patient/Family education to increase follow through with safety  techniques for completion of ADLs.  Instruction/training on safe functional mobility/transfer techniques including hand and feet placement   Instruction/training on energy conservation/work simplification for completion of ADLs    Rehab Potential: Good for established goals.      Patient / Family Goal: N/A      Patient and/or family were instructed on functional diagnosis, prognosis/goals and OT plan of care. Demonstrated fair understanding.     Eval Complexity: Low    Time In: 10:50 AM   Time Out: 11:15 AM    Total Treatment Time: 10      Min Units   OT Eval Low 97165  X  1    OT Eval Medium 71819      OT Eval High 51267      OT Re-Eval 79828            ADL/Self Care 01205 10 1   Therapeutic Activities 20985       Therapeutic Ex 01770       Orthotic Management 84544       Manual 47004     Neuro Re-Ed 48205       Non-Billable Time        Evaluation Time additionally includes thorough review of current medical information, gathering information on past medical history/social history and prior level of function, interpretation of standardized testing/informal observation of tasks, assessment of data and development of plan of care and goals.        Evaluating OT: Dennise Sullivan OTR/L #[id number]            B

## 2024-08-19 NOTE — PROGRESS NOTES
Hepatobiliary and Pancreatic Surgery Progress Note    CC: Acute cholangitis    Subjective: Feels well. Denies pain. States she is going home today AMA if she is not released     OBJECTIVE      Physical    BP (!) 144/68   Pulse 83   Temp 97.6 °F (36.4 °C) (Oral)   Resp 18   Ht 1.626 m (5' 4\")   Wt 97.1 kg (214 lb)   SpO2 95%   BMI 34.54 kg/m²       General appearance: appears in no acute distress  Lungs:respiratory effort normal without accessory numbers  Heart: no pedal edema  Abdomen: soft, nondistended, nontympanic, no guarding, no peritoneal signs, normoactive bowel sounds  Extremities: ROM normal    ASSESSMENT: Obstructive jaundice due to acute cholangitis, acute cholecystitis, gallstone pancreatitis     PLAN:    -Continue low-fat diet  -Continue Zosyn  -Leukocytosis downtrended overall  - OK for d/c from [facility]B perspective  - Follow up with Dr. Bhagat in office to discuss interval cholecystectomy.     Thank you for the consultation and allowing me to take part in Ms. Leobardo Bell's care.      Gina Miller MD 8/19/2024 9:12 AM

## 2024-08-20 VITALS
DIASTOLIC BLOOD PRESSURE: 64 MMHG | WEIGHT: 214 LBS | HEART RATE: 80 BPM | BODY MASS INDEX: 36.54 KG/M2 | HEIGHT: 64 IN | RESPIRATION RATE: 18 BRPM | OXYGEN SATURATION: 98 % | TEMPERATURE: 98.8 F | SYSTOLIC BLOOD PRESSURE: 169 MMHG

## 2024-08-20 LAB
AFP SERPL-MCNC: <1.8 UG/L
BASOPHILS # BLD: 0 K/UL (ref 0–0.2)
BASOPHILS NFR BLD: 0 % (ref 0–2)
CANCER AG19-9 SERPL IA-ACNC: 80 U/ML (ref 0–35)
EOSINOPHIL # BLD: 0.27 K/UL (ref 0.05–0.5)
EOSINOPHILS RELATIVE PERCENT: 2 % (ref 0–6)
ERYTHROCYTE [DISTWIDTH] IN BLOOD BY AUTOMATED COUNT: 14.6 % (ref 11.5–15)
HCT VFR BLD AUTO: 33.4 % (ref 34–48)
HGB BLD-MCNC: 10.4 G/DL (ref 11.5–15.5)
IGG 1: 507 MG/DL (ref 240–1118)
IGG 2: 428 MG/DL (ref 124–549)
IGG 3: 74 MG/DL (ref 21–134)
IGG4 SER-MCNC: 66 MG/DL (ref 1–123)
LYMPHOCYTES NFR BLD: 1.6 K/UL (ref 1.5–4)
LYMPHOCYTES RELATIVE PERCENT: 10 % (ref 20–42)
MCH RBC QN AUTO: 28.6 PG (ref 26–35)
MCHC RBC AUTO-ENTMCNC: 31.1 G/DL (ref 32–34.5)
MCV RBC AUTO: 91.8 FL (ref 80–99.9)
MONOCYTES NFR BLD: 0.53 K/UL (ref 0.1–0.95)
MONOCYTES NFR BLD: 4 % (ref 2–12)
NEUTROPHILS NFR BLD: 84 % (ref 43–80)
NEUTS SEG NFR BLD: 12.91 K/UL (ref 1.8–7.3)
PLATELET # BLD AUTO: 323 K/UL (ref 130–450)
PMV BLD AUTO: 9.4 FL (ref 7–12)
RBC # BLD AUTO: 3.64 M/UL (ref 3.5–5.5)
RBC # BLD: NORMAL 10*6/UL
WBC OTHER # BLD: 15.3 K/UL (ref 4.5–11.5)

## 2024-08-20 PROCEDURE — 6360000002 HC RX W HCPCS: Performed by: NURSE PRACTITIONER

## 2024-08-20 PROCEDURE — 6370000000 HC RX 637 (ALT 250 FOR IP): Performed by: NURSE PRACTITIONER

## 2024-08-20 PROCEDURE — 97161 PT EVAL LOW COMPLEX 20 MIN: CPT

## 2024-08-20 PROCEDURE — 36415 COLL VENOUS BLD VENIPUNCTURE: CPT

## 2024-08-20 PROCEDURE — 2580000003 HC RX 258: Performed by: NURSE PRACTITIONER

## 2024-08-20 PROCEDURE — 85025 COMPLETE CBC W/AUTO DIFF WBC: CPT

## 2024-08-20 RX ORDER — BISACODYL 5 MG/1
10 TABLET, DELAYED RELEASE ORAL ONCE
Status: COMPLETED | OUTPATIENT
Start: 2024-08-20 | End: 2024-08-20

## 2024-08-20 RX ORDER — PANTOPRAZOLE SODIUM 40 MG/1
40 TABLET, DELAYED RELEASE ORAL
Qty: 30 TABLET | Refills: 3 | Status: SHIPPED | OUTPATIENT
Start: 2024-08-21

## 2024-08-20 RX ORDER — DOCUSATE SODIUM 100 MG/1
100 CAPSULE, LIQUID FILLED ORAL DAILY
Status: DISCONTINUED | OUTPATIENT
Start: 2024-08-20 | End: 2024-08-20 | Stop reason: HOSPADM

## 2024-08-20 RX ORDER — METOPROLOL SUCCINATE 50 MG/1
50 TABLET, EXTENDED RELEASE ORAL DAILY
Qty: 30 TABLET | Refills: 3 | Status: SHIPPED | OUTPATIENT
Start: 2024-08-21

## 2024-08-20 RX ORDER — HYDROCHLOROTHIAZIDE 25 MG/1
25 TABLET ORAL EVERY MORNING
Status: DISCONTINUED | OUTPATIENT
Start: 2024-08-20 | End: 2024-08-20 | Stop reason: HOSPADM

## 2024-08-20 RX ADMIN — BISACODYL 10 MG: 5 TABLET, COATED ORAL at 09:37

## 2024-08-20 RX ADMIN — PANTOPRAZOLE SODIUM 40 MG: 40 TABLET, DELAYED RELEASE ORAL at 06:13

## 2024-08-20 RX ADMIN — PIPERACILLIN AND TAZOBACTAM 3375 MG: 3; .375 INJECTION, POWDER, LYOPHILIZED, FOR SOLUTION INTRAVENOUS at 06:09

## 2024-08-20 RX ADMIN — DOCUSATE SODIUM 100 MG: 100 CAPSULE, LIQUID FILLED ORAL at 09:37

## 2024-08-20 RX ADMIN — METOPROLOL SUCCINATE 50 MG: 50 TABLET, EXTENDED RELEASE ORAL at 08:17

## 2024-08-20 RX ADMIN — LEVOTHYROXINE SODIUM 75 MCG: 75 TABLET ORAL at 06:13

## 2024-08-20 NOTE — DISCHARGE INSTRUCTIONS
Call Dr Bhagat's office to scheduled follow up appointment [phone] or [phone]  Call for follow up with Dr. Jacques upon discharge

## 2024-08-20 NOTE — PROGRESS NOTES
[facility] Quality Flow/Interdisciplinary Rounds Progress Note        Quality Flow Rounds held on August 20, 2024    Disciplines Attending:  Bedside Nurse, , , and Nursing Unit Leadership    Minerva Bell was admitted on 8/14/2024  1:02 PM    Anticipated Discharge Date:       Disposition:    Israel Score:  Israel Scale Score: 20    Readmission Risk              Risk of Unplanned Readmission:  12           Discussed patient goal for the day, patient clinical progression, and barriers to discharge.  The following Goal(s) of the Day/Commitment(s) have been identified:   iv abx, discharge today       Joie Gee RN  August 20, 2024

## 2024-08-20 NOTE — PLAN OF CARE
Problem: Discharge Planning  Goal: Discharge to home or other facility with appropriate resources  8/20/2024 0958 by Leonard Brandon, RN  Outcome: Progressing  8/19/2024 2131 by Susan Walter, RN  Outcome: Progressing  Flowsheets (Taken 8/19/2024 1155 by Buster Brantley, RN)  Discharge to home or other facility with appropriate resources: Refer to discharge planning if patient needs post-hospital services based on physician order or complex needs related to functional status, cognitive ability or social support system     Problem: Safety - Adult  Goal: Free from fall injury  8/20/2024 0958 by Leonard Brandon, RN  Outcome: Progressing  8/19/2024 2131 by Susan Walter, RN  Outcome: Progressing

## 2024-08-20 NOTE — PLAN OF CARE
Problem: Discharge Planning  Goal: Discharge to home or other facility with appropriate resources  8/20/2024 1000 by Leonard Brandon, RN  Outcome: Progressing  8/20/2024 0958 by Leonard Brandon RN  Outcome: Progressing  8/19/2024 2131 by Susan Walter, RN  Outcome: Progressing  Flowsheets (Taken 8/19/2024 1155 by Buster Brantley, RN)  Discharge to home or other facility with appropriate resources: Refer to discharge planning if patient needs post-hospital services based on physician order or complex needs related to functional status, cognitive ability or social support system     Problem: Safety - Adult  Goal: Free from fall injury  8/20/2024 1000 by Leonard Brandon, RN  Outcome: Progressing  8/20/2024 0958 by Leonard Brandon RN  Outcome: Progressing  8/19/2024 2131 by Susan Walter, RN  Outcome: Progressing

## 2024-08-20 NOTE — DISCHARGE SUMMARY
[facility]   Discharge summary   Patient ID:  Minerva Bell  [id number]  69 y.o.  1955    Admit date: 8/14/2024    Discharge date and time: 8/20/2024    Admission Diagnoses:   Patient Active Problem List   Diagnosis    Obstructive jaundice    Acute pancreatitis    Acute cholecystitis    Painless jaundice    Tachypnea       Discharge Diagnoses:   Patient Active Problem List   Diagnosis    Obstructive jaundice    Acute pancreatitis    Acute cholecystitis    Painless jaundice    Tachypnea       Consults: Cardiology, Hematology, GI, General Surgery     Procedures: None    Hospital Course: The patient is a 69 y.o. female of Dave Anderson MD without a past medical history.  Patient presents to the ED with complaints of fatigue and weakness that been ongoing for the past week.  Patient admits she has not been eating or drinking very well.  She lives by herself and she admits she has been sleeping a lot more.  She denies any chest pain shortness of breath however she does complain of some diffuse gas pain to her abdomen but no abdominal pain upon assessment.  Patient admits she feels like she has a virus due to her symptoms.  ER workup revealed sodium level 125, potassium 3.2, troponin 135, elevated liver enzymes with a total bili of 5.7, Lipase 371, WBC 47.7, imaging reveals acute pancreatitis, cholecystitis and cholelithiasis.  Patient is admitted to telemetry unit for further testing and treatment.       On my evaluation, she is quite pleasant and alert and O x 3..  She answers all questions appropriately.  She indicates she started noticing yellowing of her skin and eyes a few days ago.  She follows with Dr. Nikhil Soto regularly every 6 months.  She has not had any significant findings on her blood work as far as she knows.  After discussion  With her and explanation that we will start only with an MRCP no invasive studies until we have more information, she is agreeable to having the MRCP  calcification in the ostium of the right left renal artery causing a component of obstruction difficult to be calculated at least of moderate to moderate-to-severe degree. There is no dissection the abdominal aorta. Kidneys are preserved size and cortical thickness.  The have preserved the cortical enhancement. Most likely there bilateral parapelvic cysts or a component of obstructive uropathy in there is a pattern of the distention of the intrarenal collecting system of both kidneys form a pattern of ureter pelvic junction obstruction or stenosis.  There is no previous studies available for comparison.  Cannot exclude a component parapelvic cysts.  This can be addition evaluated by late images fall on the present examination when contrast will be discrete in the collecting system of the kidneys.  Can consider initial correlation with the KUB which will represent a late phase of an IVP study. The bladder appears unremarkable being mild to moderate distended. The uterus has unremarkable appearance for the age group.  The central endometrial cavity is about 5.5 mm being borderline for the age. There is a cyst for the left ovary measuring 4.7 x 4 cm with simple cyst density.  This can be followed pelvic ultrasound.  The right ovary does not appears to be enlarged. Minimal fluid accumulation in the pelvic cavity relate with the process from the upper abdomen. Lower lung bases demonstrate no significant findings.  Areas of chronic appearing linear subsegmental atelectasis are seen predominant the diaphragma surface of the left lower lobe and lingula. Degenerative changes are seen throughout the thoracolumbar spine at multiple levels, within several disc spaces and between facet joints in between spinous process.     1.  Findings for acute cholecystitis.  The gallbladder is to well distended with thickening of the wall surrounded by Anahi cholecystic fluid accumulation and inflammatory changes.  Are multiple calculi in the

## 2024-08-20 NOTE — PROGRESS NOTES
Physical Therapy  Facility/Department: [address] INTERMEDIATE  Physical Therapy Initial Assessment    Name: Minerva Bell  : 1955  MRN: [id number]  Date of Service: 2024          Patient Diagnosis(es): The primary encounter diagnosis was Painless jaundice. Diagnoses of Acute pancreatitis, unspecified complication status, unspecified pancreatitis type, Leukocytosis, unspecified type, Elevated LFTs, Gallstones, and Acute coronary syndrome (HCC) were also pertinent to this visit.  Past Medical History:  has no past medical history on file.  Past Surgical History:  has no past surgical history on file.         Requires PT Follow-Up: Yes   Evaluating Therapist: Nicole Freedman PT     Referring Provider:      Deisy Jean APRN - CNP       PT order : PT eval and treat     Room #: 630   DIAGNOSIS: The primary encounter diagnosis was Painless jaundice. Diagnoses of Acute pancreatitis, unspecified complication status, unspecified pancreatitis type, Leukocytosis, unspecified type, Elevated LFTs, Gallstones, and Acute coronary syndrome (HCC) were also pertinent to this visit.    PRECAUTIONS: falls     Social:  Pt lives alone  in a  1  floor plan  with elevator  to enter.  Prior to admission pt walked with  no AD . Has service dog      Initial Evaluation  Date:  2024  Treatment      Short Term/ Long Term   Goals   Was pt agreeable to Eval/treatment?  Yes       Does pt have pain? Yes      Bed Mobility  Rolling:  NT   Supine to sit:  NT   Sit to supine:  NT   Scooting:  independent in sit    Independent    Transfers Sit to stand:  S/SBA   Stand to sit:  S/SBA   Stand pivot:  Nt    Independent    Ambulation     15 feet x 2  with  no AD  with  SBA    50 feet with  no AD/AAD independent        Stair negotiation: ascended and descended NT   N/A    LE ROM  WFL     LE strength  4- / 5   4 to 4+/ 5    AM- PAC RAW score   17/ 24            Pt is alert and Oriented      Balance: SBA with gait . Fall risk due to  reduce fall risk  [x] Endurance Training to improve activity tolerance during functional mobility   [x] Transfer Training to improve safety and independence with all functional transfers   [x] Gait Training to improve gait mechanics, endurance and assess need for appropriate assistive device  [] Stair Training in preparation for safe discharge home and/or into the community   [x] Positioning to prevent skin breakdown and contractures  [x] Safety and Education Training   [x] Patient/Caregiver Education   [] HEP  [] Other     Frequency of treatments will be 2-5x/week x 2-10  days.    Time in: 0953   Time out:  1004       Evaluation Time includes thorough review of current medical information, gathering information on past medical history/social history and prior level of function, completion of standardized testing/informal observation of tasks, assessment of data and education on plan of care and goals.    CPT codes:  [x] Low Complexity PT evaluation 84607  [] Moderate Complexity PT evaluation 09364  [] High Complexity PT evaluation 87808  [] PT Re-evaluation 70940  [] Gait training 39395  minutes  [] Therapeutic activities 83744  minutes  [] Therapeutic exercises 03775  minutes  [] Neuromuscular reeducation 11411  minutes       Nicole Freedman  License number:  [id number]

## 2024-08-20 NOTE — PROGRESS NOTES
[facility]                                Progress note    Subjective:  Denies chest pain and dyspnea.  Denies abdominal pain   Complains of constipation  Refusing PT OT evaluation  Requesting a taxi pick her up and bring her to [place] upon discharge as her family will not be able to pick her up until later this evening    Objective:  Sitting up in a chair  Explained that there is no taxi service offered to expedite discharge    BP (!) 140/65   Pulse 78   Temp 99.3 °F (37.4 °C) (Oral)   Resp 18   Ht 1.626 m (5' 4\")   Wt 97.1 kg (214 lb)   SpO2 92%   BMI 34.54 kg/m²   CONST:  Well developed, obese, elderly  female who appears stated age. Awake, alert, cooperative, no apparent distress  HEENT:   Head- Normocephalic, atraumatic   Eyes- Conjunctivae pink, anicteric  Throat- Oral mucosa pink and moist  Neck-  No stridor, trachea midline, no jugular venous distention. No adenopathy   CHEST: Chest symmetrical and non-tender to palpation. No accessory muscle use or intercostal retractions  RESPIRATORY: Lung sounds - clear throughout fields   CARDIOVASCULAR:     No carotid bruit  Heart Inspection- shows no noted pulsations  Heart Palpation- no heaves or thrills; PMI is non-displaced   Heart Ausculation- Regular rate and rhythm, no murmur. No s3, s4 or rub   PV: No lower extremity edema. No varicosities. Pedal pulses palpable, no clubbing or cyanosis   ABDOMEN: Soft, obese, non-tender to light palpation. Bowel sounds present. No palpable masses no organomegaly; no abdominal bruit  MS: Good muscle strength and tone. No atrophy or abnormal movements.   : Deferred  SKIN: Warm and dry no statis dermatitis or ulcers   NEURO / PSYCH: Oriented to person, place and time. Speech clear and appropriate. Follows all commands. Pleasant affect        Recent Labs     08/18/24  1020 08/19/24  0702 08/20/24  0539   WBC 19.5* 20.6* 15.3*   HGB 10.9* 11.2* 10.4*   HCT 34.7 34.8 33.4*    373 323        Recent Labs     08/18/24  1020 08/19/24  0702    136   K 3.8 3.5   CL 99 103   CO2 24 26   BUN 10 7   CREATININE 0.7 0.7   CALCIUM 7.6* 7.6*       Assessment/Plan:  69-year-old female presents to the ED with complaints of abdominal pain that is been ongoing for a week and is admitted to telemetry unit with     Painless obstructive jaundice/acute pancreatitis/acute cholecystitis  Pain control  Monitor LFTs-total bili 5.2 fall  MRCP followed by ERCP for closer look at biliary ducts/obstruction-she is now agreeable to MRCP  IV hydration for peripancreatic stranding and inflammation/consistent with acute pancreatitis  IV zosyn 3375  Monitor WBC - 56.4  Hold anticoagulation  Antiemitics  Monitor Lipase - 371>193  Strict NPO  General Surgery/GI following  MRI completed for acute confusion and delirium-unremarkable for metastatic disease     Leukocytosis of unknown significance  Has gone up to 56,000 today from 47 on arrival  Check peripheral smear  Hematology evaluation  Rule out blood disorder/dyscrasia       8/16/24  -While attempting echo patient went into A-fib with RVR with heart rates in the 200s which appears to be new  -IV Lopressor ordered as well as p.o. Toprol-XL per cardiology  -K+ 3.4, replace  -BNP 4,128  -Improvement in LFTs  -MRCP acute interstitial pancreatitis with acute cholecystitis, small ascites and trace bilateral pleural effusions  -RRT was called later on in the morning due to AMS tachypnea and lethargy  -Echocardiogram completed awaiting read  -Awaiting input from surgery  -Continue NPO, IVF  -Await heme-onc input regarding leukocytosis  -Continue IV Zosynn    Patient seen and examined  RRT this morning secondary to A-fib RVR and lethargy  Appreciate cardiology input  Appreciate oncology input  MRCP completed and reviewed  White count remains markedly elevated at 56,000-not thought to be blood dyscrasia or blood malignancy per oncology-but workup is underway  As such we will obtain

## 2024-08-20 NOTE — PLAN OF CARE
Problem: Discharge Planning  Goal: Discharge to home or other facility with appropriate resources  8/19/2024 2131 by Susan Walter, RN  Outcome: Progressing  Flowsheets (Taken 8/19/2024 1155 by Buster Brantley, RN)  Discharge to home or other facility with appropriate resources: Refer to discharge planning if patient needs post-hospital services based on physician order or complex needs related to functional status, cognitive ability or social support system     Problem: Safety - Adult  Goal: Free from fall injury  Outcome: Progressing

## 2024-08-20 NOTE — PROGRESS NOTES
Physician Progress Note      PATIENT:               KISHA RIVERA  [facility] #:                  [id number]  :                       1955  ADMIT DATE:       2024 1:02 PM  DISCH DATE:        2024 11:05 AM  RESPONDING  PROVIDER #:        German Jacques MD          QUERY TEXT:    Patient admitted with biliary pancreatitis. Noted documentation of . In order   to support the diagnosis of ***, please include additional clinical indicators   in your documentation.  Or please document if the diagnosis of *** has been   ruled out after further study.    The medical record reflects the following:  Risk Factors: HTN, afib RVR  Clinical Indicators: Consult and PN's \"  Found to have elevated troponin   somewhat flat pattern in the 120s and 130s, with some EKG changes. Elevated   hs-cTnT: 135-138-128 ng/L ? Type II MI...Appears asymptomatic from cardiac   standpoint.\"   PN \"Echo showed normal EF and wall motion.\"  Treatment: echo, eventual ischemic evaluation outpatient, metoprolol    Thank you,  Irma Mackenzie RN, CCDS  Clinical Documentation   [email or web address]  Options provided:  -- Type II MI present as evidenced by, Please document evidence.  -- Type II MI was ruled out  -- Other - I will add my own diagnosis  -- Disagree - Not applicable / Not valid  -- Disagree - Clinically unable to determine / Unknown  -- Refer to Clinical Documentation Reviewer    PROVIDER RESPONSE TEXT:    Type II MI is present as evidenced by Peak and fall of troponin and EKG   changes    Query created by: Irma Mackenzie on 2024 11:39 AM      Electronically signed by:  German Jacques MD 2024 11:45 AM

## 2024-08-21 LAB
CHROMOGRANIN A: 102 NG/ML (ref 0–187)
SEND OUT REPORT: NORMAL
TEST NAME: NORMAL

## 2024-08-21 NOTE — PROGRESS NOTES
Physician Progress Note      PATIENT:               KISHA RIVERA  CSN #:                  [id number]  :                       1955  ADMIT DATE:       2024 1:02 PM  DISCH DATE:        2024 11:05 AM  RESPONDING  PROVIDER #:        Eloise Suarez MD          QUERY TEXT:    Pt admitted with acute cholangitis, gallstone pancreatitis. Pt noted to have   on WBC 47.7-63.0-15.3, lactic sepsis 22, procalcitonin 0.26.  If possible,   please document in the progress notes and discharge summary if you are   evaluating and /or treating any of the following:  The medical record reflects the following:  Risk Factors: acute cholangitis, gallstone pancreatitis  Clinical Indicators: WBC 47.7-63.0-15.3, lactic sepsis 22, procalcitonin 0.26  Treatment: IV Zosyn transition to po Augmentin     Thank you,  Irma Mackenzie RN, CCDS  Clinical Documentation   [email or web address]  Options provided:  -- Sepsis, present on admission  -- Sepsis, present on admission, now resolved  -- Acute cholangitis, biliary pancreatitis without Sepsis  -- Other - I will add my own diagnosis  -- Disagree - Not applicable / Not valid  -- Disagree - Clinically unable to determine / Unknown  -- Refer to Clinical Documentation Reviewer    PROVIDER RESPONSE TEXT:    This patient has sepsis which was present on admission.    Query created by: Irma Mackenzie on 2024 11:29 AM      Electronically signed by:  Eloise Suarez MD 2024 2:20 PM

## 2024-08-23 LAB
SEND OUT REPORT: NORMAL
TEST NAME: NORMAL

## 2024-08-28 ENCOUNTER — TELEPHONE (OUTPATIENT)
Dept: CARDIOLOGY CLINIC | Age: 69
End: 2024-08-28

## 2024-08-28 DIAGNOSIS — R06.09 DOE (DYSPNEA ON EXERTION): Primary | ICD-10-CM

## 2024-08-28 NOTE — TELEPHONE ENCOUNTER
Pt phnd to schedule hosp fup appt with Dr Jacques (prefers mid morning appt & not on 9/2 or 9/4).  Please call her at [phone] to schedule.

## 2024-08-29 DIAGNOSIS — I21.4 NSTEMI (NON-ST ELEVATED MYOCARDIAL INFARCTION) (HCC): ICD-10-CM

## 2024-08-29 DIAGNOSIS — R06.02 SHORTNESS OF BREATH: ICD-10-CM

## 2024-08-29 DIAGNOSIS — R07.9 CHEST PAIN, UNSPECIFIED TYPE: Primary | ICD-10-CM

## 2024-08-29 RX ORDER — REGADENOSON 0.08 MG/ML
0.4 INJECTION, SOLUTION INTRAVENOUS
OUTPATIENT
Start: 2024-08-29

## 2024-08-29 NOTE — TELEPHONE ENCOUNTER
Patient notified to and scheduled for HFU and order placed for Pharm stress patient given number to stress dept to get scheduled [phone]. Patient states she is feeling much better.

## 2024-08-29 NOTE — PROGRESS NOTES
Physician Progress Note      PATIENT:               KISHA RIVERA  CSN #:                  [id number]  :                       1955  ADMIT DATE:       2024 1:02 PM  DISCH DATE:        2024 11:05 AM  RESPONDING  PROVIDER #:        Eloise Suarez MD          QUERY TEXT:    Pt admitted with sepsis, acute cholecystitis and has encephalopathy   documented. If possible, please document in progress notes and discharge   summary further specificity regarding the type of encephalopathy:    The medical record reflects the following:  Risk Factors: sepsis, acute cholecystitis  Clinical Indicators: RRT  \"RRT was called due to AMS and dyspnea...Patient   is alert and oriented x 3 now.  Concern for acute cholecystitis, likely   contributing to intermittent mental status changes.\"   cardiology PN \"Encephalopathy.\"  Treatment: MRI brain, IVF, monitor labs    Thank you,  Irma Mackenzie RN, CCDS  Clinical Documentation   [email or web address]  Options provided:  -- Metabolic encephalopathy  -- Septic encephalopathy  -- Other - I will add my own diagnosis  -- Disagree - Not applicable / Not valid  -- Disagree - Clinically unable to determine / Unknown  -- Refer to Clinical Documentation Reviewer    PROVIDER RESPONSE TEXT:    This patient has metabolic encephalopathy.    Query created by: Irma Mackenzie on 2024 8:04 AM      Electronically signed by:  Eloise Suarez MD 2024 10:03 PM

## 2024-08-30 ENCOUNTER — TELEPHONE (OUTPATIENT)
Dept: CARDIOLOGY | Age: 69
End: 2024-08-30

## 2024-08-30 NOTE — TELEPHONE ENCOUNTER
CALLED PATIENT AND LEFT MESSAGE TO SCHEDULE STRESS    Electronically signed by Deisy Aguila on 8/30/2024 at 10:23 AM

## 2024-09-10 ENCOUNTER — TELEPHONE (OUTPATIENT)
Dept: CARDIOLOGY | Age: 69
End: 2024-09-10

## 2024-09-12 ENCOUNTER — HOSPITAL ENCOUNTER (OUTPATIENT)
Dept: CARDIOLOGY | Age: 69
Discharge: HOME | End: 2024-09-14
Attending: INTERNAL MEDICINE
Payer: COMMERCIAL

## 2024-09-12 VITALS
HEIGHT: 66 IN | HEART RATE: 80 BPM | WEIGHT: 214 LBS | RESPIRATION RATE: 14 BRPM | BODY MASS INDEX: 34.39 KG/M2 | SYSTOLIC BLOOD PRESSURE: 158 MMHG | DIASTOLIC BLOOD PRESSURE: 76 MMHG

## 2024-09-12 DIAGNOSIS — R07.9 CHEST PAIN, UNSPECIFIED TYPE: Primary | ICD-10-CM

## 2024-09-12 DIAGNOSIS — I21.4 NSTEMI (NON-ST ELEVATED MYOCARDIAL INFARCTION) (HCC): ICD-10-CM

## 2024-09-12 DIAGNOSIS — R06.02 SHORTNESS OF BREATH: ICD-10-CM

## 2024-09-12 LAB
ECHO BSA: 2.13 M2
NUC STRESS EJECTION FRACTION: 70 %
STRESS BASELINE DIAS BP: 76 MMHG
STRESS BASELINE HR: 79 BPM
STRESS BASELINE SYS BP: 158 MMHG
STRESS ESTIMATED WORKLOAD: 1 METS
STRESS PEAK DIAS BP: 74 MMHG
STRESS PEAK SYS BP: 152 MMHG
STRESS PERCENT HR ACHIEVED: 62 %
STRESS POST PEAK HR: 92 BPM
STRESS RATE PRESSURE PRODUCT: NORMAL BPM*MMHG
STRESS TARGET HR: 149 BPM

## 2024-09-12 PROCEDURE — 93017 CV STRESS TEST TRACING ONLY: CPT

## 2024-09-12 PROCEDURE — 3430000000 HC RX DIAGNOSTIC RADIOPHARMACEUTICAL: Performed by: INTERNAL MEDICINE

## 2024-09-12 PROCEDURE — 6360000002 HC RX W HCPCS: Performed by: INTERNAL MEDICINE

## 2024-09-12 PROCEDURE — A9500 TC99M SESTAMIBI: HCPCS | Performed by: INTERNAL MEDICINE

## 2024-09-12 PROCEDURE — 2580000003 HC RX 258: Performed by: INTERNAL MEDICINE

## 2024-09-12 RX ORDER — TETRAKIS(2-METHOXYISOBUTYLISOCYANIDE)COPPER(I) TETRAFLUOROBORATE 1 MG/ML
10.7 INJECTION, POWDER, LYOPHILIZED, FOR SOLUTION INTRAVENOUS
Status: COMPLETED | OUTPATIENT
Start: 2024-09-12 | End: 2024-09-12

## 2024-09-12 RX ORDER — SODIUM CHLORIDE 0.9 % (FLUSH) 0.9 %
10 SYRINGE (ML) INJECTION PRN
Status: DISCONTINUED | OUTPATIENT
Start: 2024-09-12 | End: 2024-09-15 | Stop reason: HOSPADM

## 2024-09-12 RX ORDER — TETRAKIS(2-METHOXYISOBUTYLISOCYANIDE)COPPER(I) TETRAFLUOROBORATE 1 MG/ML
34.1 INJECTION, POWDER, LYOPHILIZED, FOR SOLUTION INTRAVENOUS
Status: COMPLETED | OUTPATIENT
Start: 2024-09-12 | End: 2024-09-12

## 2024-09-12 RX ORDER — REGADENOSON 0.08 MG/ML
0.4 INJECTION, SOLUTION INTRAVENOUS
Status: COMPLETED | OUTPATIENT
Start: 2024-09-12 | End: 2024-09-12

## 2024-09-12 RX ADMIN — Medication 10.7 MILLICURIE: at 08:50

## 2024-09-12 RX ADMIN — REGADENOSON 0.4 MG: 0.08 INJECTION, SOLUTION INTRAVENOUS at 09:44

## 2024-09-12 RX ADMIN — SODIUM CHLORIDE, PRESERVATIVE FREE 10 ML: 5 INJECTION INTRAVENOUS at 08:51

## 2024-09-12 RX ADMIN — SODIUM CHLORIDE, PRESERVATIVE FREE 10 ML: 5 INJECTION INTRAVENOUS at 09:44

## 2024-09-12 RX ADMIN — Medication 34.1 MILLICURIE: at 09:44

## 2024-09-12 RX ADMIN — SODIUM CHLORIDE, PRESERVATIVE FREE 10 ML: 5 INJECTION INTRAVENOUS at 09:45

## 2024-09-16 ENCOUNTER — TELEPHONE (OUTPATIENT)
Dept: CARDIOLOGY CLINIC | Age: 69
End: 2024-09-16

## 2024-09-16 NOTE — TELEPHONE ENCOUNTER
----- Message from Dr. German Jacques MD sent at 9/13/2024  9:00 PM EDT -----  Stress test looks good.  Showed normal blood flow to the heart and normal pumping function.  No signs of any blockages.    Follow-up as scheduled.

## 2024-09-19 ENCOUNTER — OFFICE VISIT (OUTPATIENT)
Dept: HEMATOLOGY | Age: 69
End: 2024-09-19
Payer: COMMERCIAL

## 2024-09-19 VITALS
RESPIRATION RATE: 16 BRPM | WEIGHT: 189.7 LBS | DIASTOLIC BLOOD PRESSURE: 76 MMHG | SYSTOLIC BLOOD PRESSURE: 175 MMHG | HEART RATE: 86 BPM | OXYGEN SATURATION: 96 % | HEIGHT: 66 IN | TEMPERATURE: 97.9 F | BODY MASS INDEX: 30.49 KG/M2

## 2024-09-19 DIAGNOSIS — K83.09 ACUTE CHOLANGITIS: Primary | ICD-10-CM

## 2024-09-19 DIAGNOSIS — Z09 FOLLOW-UP EXAM: ICD-10-CM

## 2024-09-19 PROCEDURE — 99212 OFFICE O/P EST SF 10 MIN: CPT | Performed by: TRANSPLANT SURGERY

## 2024-09-20 ASSESSMENT — ENCOUNTER SYMPTOMS
EYE DISCHARGE: 0
NAUSEA: 0
BACK PAIN: 0
VOMITING: 0
ABDOMINAL PAIN: 0
BLOOD IN STOOL: 0
SHORTNESS OF BREATH: 0
CONSTIPATION: 0
PHOTOPHOBIA: 0
EYE PAIN: 0
DIARRHEA: 0

## 2024-09-26 NOTE — PROGRESS NOTES
recurrence of AF which she declined at this time, but reports she will think about.  She reports she has not been taking her metoprolol and was educated about importance of metoprolol for management of atrial fibrillation.  She defers restarting at this time and wants to talk with her PCP about resumption of this medication.  Physical exam is unremarkable.    Past medical history:  NSTEMI 8/2024 (135 > 138 > 128 > 118) likely type II 2/2 concomitant acute cholecystitis, acute pancreatitis, and obstructive cholangitis.  PAF:  AF RVR identified inpatient 8/16/2024 with concomitant cholecystitis/pancreatitis, obstructive jaundice  [id number] at least 3, patient deferred OAC  Palpitations, patient reports wearing a prior Holter monitor that was negative sometime last year.  No results in epicNo results in EPIC.  Hypertension  Hypothyroidism, on HRT  Obstructive painless jaundice with acute cholangitis 8/2024  Acute pancreatitis/cholecystitis on imaging and MRCP 8/2024, treated medically with follow-up to discuss cholecystectomy      Cardiac testing:  TTE 8/16/2024: EF 70 to 75%.  Hyperdynamic LV.  Mildly increased wall thickness.  NWMA.  Indeterminate diastolic function.  Normal RV size and function.  Mildly dilated ascending aorta.  Trivial pericardial effusion with no tamponade.  Mild MAC.  NM stress 9/12/2024: Negative for myocardial ischemia or infarction.  EF 70%.  Perfusion defect present in anterior segment that is fixed and appears to be probable artifact.  Low risk study.      Problem List  Patient Active Problem List    Diagnosis Date Noted    Tachypnea 08/16/2024    Painless jaundice 08/15/2024    Obstructive jaundice 08/14/2024    Acute pancreatitis 08/14/2024    Acute cholecystitis 08/14/2024       Allergies:  Patient has no known allergies.    Social History:    Social History     Socioeconomic History    Marital status:      Spouse name: Not on file    Number of children: Not on file    Years of

## 2024-10-01 ENCOUNTER — OFFICE VISIT (OUTPATIENT)
Dept: CARDIOLOGY CLINIC | Age: 69
End: 2024-10-01
Payer: COMMERCIAL

## 2024-10-01 VITALS
WEIGHT: 186.2 LBS | HEART RATE: 82 BPM | DIASTOLIC BLOOD PRESSURE: 88 MMHG | HEIGHT: 63 IN | BODY MASS INDEX: 32.99 KG/M2 | SYSTOLIC BLOOD PRESSURE: 134 MMHG | RESPIRATION RATE: 16 BRPM

## 2024-10-01 DIAGNOSIS — I21.4 NSTEMI (NON-ST ELEVATED MYOCARDIAL INFARCTION) (HCC): Primary | ICD-10-CM

## 2024-10-01 DIAGNOSIS — I48.0 PAF (PAROXYSMAL ATRIAL FIBRILLATION) (HCC): ICD-10-CM

## 2024-10-01 PROCEDURE — G8484 FLU IMMUNIZE NO ADMIN: HCPCS

## 2024-10-01 PROCEDURE — G8417 CALC BMI ABV UP PARAM F/U: HCPCS

## 2024-10-01 PROCEDURE — 1090F PRES/ABSN URINE INCON ASSESS: CPT

## 2024-10-01 PROCEDURE — 99214 OFFICE O/P EST MOD 30 MIN: CPT

## 2024-10-01 PROCEDURE — 3017F COLORECTAL CA SCREEN DOC REV: CPT

## 2024-10-01 PROCEDURE — 93000 ELECTROCARDIOGRAM COMPLETE: CPT | Performed by: INTERNAL MEDICINE

## 2024-10-01 PROCEDURE — G8427 DOCREV CUR MEDS BY ELIG CLIN: HCPCS

## 2024-10-01 PROCEDURE — 1036F TOBACCO NON-USER: CPT

## 2024-10-01 PROCEDURE — G8400 PT W/DXA NO RESULTS DOC: HCPCS

## 2024-10-01 PROCEDURE — 1123F ACP DISCUSS/DSCN MKR DOCD: CPT

## 2024-10-01 NOTE — PATIENT INSTRUCTIONS
Recommenced restarting Metoprolol. Discuss with Dr Anderson.    Consider Zio patch for atrial fibrillation surveillance, call the office if you would like to have the monitor.      Follow up with Dr Jacques in 1 year, sooner if needed.